# Patient Record
Sex: FEMALE | Race: WHITE | NOT HISPANIC OR LATINO | Employment: FULL TIME | URBAN - NONMETROPOLITAN AREA
[De-identification: names, ages, dates, MRNs, and addresses within clinical notes are randomized per-mention and may not be internally consistent; named-entity substitution may affect disease eponyms.]

---

## 2017-09-27 ENCOUNTER — CONSULT (OUTPATIENT)
Dept: ONCOLOGY | Facility: CLINIC | Age: 47
End: 2017-09-27

## 2017-09-27 ENCOUNTER — APPOINTMENT (OUTPATIENT)
Dept: LAB | Facility: HOSPITAL | Age: 47
End: 2017-09-27

## 2017-09-27 VITALS
SYSTOLIC BLOOD PRESSURE: 128 MMHG | BODY MASS INDEX: 36.53 KG/M2 | WEIGHT: 227.3 LBS | OXYGEN SATURATION: 98 % | TEMPERATURE: 97.9 F | DIASTOLIC BLOOD PRESSURE: 82 MMHG | HEART RATE: 68 BPM | RESPIRATION RATE: 16 BRPM | HEIGHT: 66 IN

## 2017-09-27 DIAGNOSIS — R79.9 ABNORMAL BLOOD CHEMISTRY: Primary | ICD-10-CM

## 2017-09-27 DIAGNOSIS — D69.1 PLATELET DYSFUNCTION (HCC): ICD-10-CM

## 2017-09-27 DIAGNOSIS — D69.1 ABNORMAL PLATELET FUNCTION TEST (HCC): ICD-10-CM

## 2017-09-27 DIAGNOSIS — D64.9 ANEMIA, UNSPECIFIED TYPE: Primary | ICD-10-CM

## 2017-09-27 PROBLEM — R31.9 HEMATURIA: Status: ACTIVE | Noted: 2017-09-27

## 2017-09-27 LAB
ALBUMIN SERPL-MCNC: 4.2 G/DL (ref 3.5–5)
ALBUMIN/GLOB SERPL: 1.3 G/DL (ref 1.1–2.5)
ALP SERPL-CCNC: 82 U/L (ref 24–120)
ALT SERPL W P-5'-P-CCNC: 35 U/L (ref 0–54)
ANION GAP SERPL CALCULATED.3IONS-SCNC: 13 MMOL/L (ref 4–13)
AST SERPL-CCNC: 26 U/L (ref 7–45)
BASOPHILS # BLD AUTO: 0.02 10*3/MM3 (ref 0–0.2)
BASOPHILS NFR BLD AUTO: 0.2 % (ref 0–2)
BILIRUB SERPL-MCNC: 0.3 MG/DL (ref 0.1–1)
BUN BLD-MCNC: 12 MG/DL (ref 5–21)
BUN/CREAT SERPL: 21.8 (ref 7–25)
CALCIUM SPEC-SCNC: 9.1 MG/DL (ref 8.4–10.4)
CHLORIDE SERPL-SCNC: 104 MMOL/L (ref 98–110)
CLOSURE TME COLL+EPINEP BLD: >300 SECONDS (ref 84–176)
CO2 SERPL-SCNC: 27 MMOL/L (ref 24–31)
CREAT BLD-MCNC: 0.55 MG/DL (ref 0.5–1.4)
CYTOLOGIST CVX/VAG CYTO: NORMAL
DEPRECATED RDW RBC AUTO: 43.3 FL (ref 40–54)
EOSINOPHIL # BLD AUTO: 0.09 10*3/MM3 (ref 0–0.7)
EOSINOPHIL NFR BLD AUTO: 1 % (ref 0–4)
ERYTHROCYTE [DISTWIDTH] IN BLOOD BY AUTOMATED COUNT: 14.3 % (ref 12–15)
FERRITIN SERPL-MCNC: 5.98 NG/ML (ref 6.24–137)
FOLATE SERPL-MCNC: 19.3 NG/ML
GFR SERPL CREATININE-BSD FRML MDRD: 119 ML/MIN/1.73
GLOBULIN UR ELPH-MCNC: 3.2 GM/DL
GLUCOSE BLD-MCNC: 100 MG/DL (ref 70–100)
HCT VFR BLD AUTO: 36.8 % (ref 37–47)
HGB BLD-MCNC: 11.9 G/DL (ref 12–16)
IMM GRANULOCYTES # BLD: 0.03 10*3/MM3 (ref 0–0.03)
IMM GRANULOCYTES NFR BLD: 0.3 % (ref 0–5)
IRON 24H UR-MRATE: 42 MCG/DL (ref 42–180)
IRON SATN MFR SERPL: 9 % (ref 20–45)
LDH SERPL-CCNC: 357 U/L (ref 265–665)
LYMPHOCYTES # BLD AUTO: 2.76 10*3/MM3 (ref 0.72–4.86)
LYMPHOCYTES NFR BLD AUTO: 30.9 % (ref 15–45)
Lab: ABNORMAL
MCH RBC QN AUTO: 26.7 PG (ref 28–32)
MCHC RBC AUTO-ENTMCNC: 32.3 G/DL (ref 33–36)
MCV RBC AUTO: 82.7 FL (ref 82–98)
MONOCYTES # BLD AUTO: 0.73 10*3/MM3 (ref 0.19–1.3)
MONOCYTES NFR BLD AUTO: 8.2 % (ref 4–12)
NEUTROPHILS # BLD AUTO: 5.3 10*3/MM3 (ref 1.87–8.4)
NEUTROPHILS NFR BLD AUTO: 59.4 % (ref 39–78)
PATH INTERP BLD-IMP: NORMAL
PLATELET # BLD AUTO: 286 10*3/MM3 (ref 130–400)
PLT THERAPY DRUG: ABNORMAL
PMV BLD AUTO: 10.5 FL (ref 6–12)
POTASSIUM BLD-SCNC: 3.9 MMOL/L (ref 3.5–5.3)
PROT SERPL-MCNC: 7.4 G/DL (ref 6.3–8.7)
RBC # BLD AUTO: 4.45 10*6/MM3 (ref 4.2–5.4)
SODIUM BLD-SCNC: 144 MMOL/L (ref 135–145)
TIBC SERPL-MCNC: 471 MCG/DL (ref 225–420)
VIT B12 BLD-MCNC: 648 PG/ML (ref 239–931)
WBC NRBC COR # BLD: 8.93 10*3/MM3 (ref 4.8–10.8)

## 2017-09-27 PROCEDURE — 85730 THROMBOPLASTIN TIME PARTIAL: CPT

## 2017-09-27 PROCEDURE — 85576 BLOOD PLATELET AGGREGATION: CPT | Performed by: INTERNAL MEDICINE

## 2017-09-27 PROCEDURE — 85245 CLOT FACTOR VIII VW RISTOCTN: CPT | Performed by: INTERNAL MEDICINE

## 2017-09-27 PROCEDURE — 83550 IRON BINDING TEST: CPT | Performed by: INTERNAL MEDICINE

## 2017-09-27 PROCEDURE — 83540 ASSAY OF IRON: CPT | Performed by: INTERNAL MEDICINE

## 2017-09-27 PROCEDURE — 84165 PROTEIN E-PHORESIS SERUM: CPT | Performed by: INTERNAL MEDICINE

## 2017-09-27 PROCEDURE — 82746 ASSAY OF FOLIC ACID SERUM: CPT | Performed by: INTERNAL MEDICINE

## 2017-09-27 PROCEDURE — 84155 ASSAY OF PROTEIN SERUM: CPT | Performed by: INTERNAL MEDICINE

## 2017-09-27 PROCEDURE — 36415 COLL VENOUS BLD VENIPUNCTURE: CPT

## 2017-09-27 PROCEDURE — 85240 CLOT FACTOR VIII AHG 1 STAGE: CPT | Performed by: INTERNAL MEDICINE

## 2017-09-27 PROCEDURE — 85246 CLOT FACTOR VIII VW ANTIGEN: CPT | Performed by: INTERNAL MEDICINE

## 2017-09-27 PROCEDURE — 83883 ASSAY NEPHELOMETRY NOT SPEC: CPT | Performed by: INTERNAL MEDICINE

## 2017-09-27 PROCEDURE — 82728 ASSAY OF FERRITIN: CPT | Performed by: INTERNAL MEDICINE

## 2017-09-27 PROCEDURE — 83615 LACTATE (LD) (LDH) ENZYME: CPT | Performed by: INTERNAL MEDICINE

## 2017-09-27 PROCEDURE — 99205 OFFICE O/P NEW HI 60 MIN: CPT | Performed by: INTERNAL MEDICINE

## 2017-09-27 PROCEDURE — 85060 BLOOD SMEAR INTERPRETATION: CPT | Performed by: INTERNAL MEDICINE

## 2017-09-27 PROCEDURE — 86334 IMMUNOFIX E-PHORESIS SERUM: CPT | Performed by: INTERNAL MEDICINE

## 2017-09-27 PROCEDURE — 80053 COMPREHEN METABOLIC PANEL: CPT | Performed by: INTERNAL MEDICINE

## 2017-09-27 PROCEDURE — 82607 VITAMIN B-12: CPT | Performed by: INTERNAL MEDICINE

## 2017-09-27 PROCEDURE — 82232 ASSAY OF BETA-2 PROTEIN: CPT | Performed by: INTERNAL MEDICINE

## 2017-09-27 PROCEDURE — 85025 COMPLETE CBC W/AUTO DIFF WBC: CPT | Performed by: INTERNAL MEDICINE

## 2017-09-27 RX ORDER — TRAZODONE HYDROCHLORIDE 100 MG/1
TABLET ORAL
Refills: 2 | COMMUNITY
Start: 2017-09-13

## 2017-09-27 RX ORDER — OMEPRAZOLE 40 MG/1
CAPSULE, DELAYED RELEASE ORAL
Refills: 2 | COMMUNITY
Start: 2017-07-27 | End: 2021-12-08 | Stop reason: ALTCHOICE

## 2017-09-27 RX ORDER — FAMOTIDINE 40 MG/1
TABLET, FILM COATED ORAL
Refills: 2 | COMMUNITY
Start: 2017-07-27 | End: 2020-05-19

## 2017-09-27 RX ORDER — METOPROLOL SUCCINATE 25 MG/1
TABLET, EXTENDED RELEASE ORAL
Refills: 2 | COMMUNITY
Start: 2017-09-13 | End: 2021-12-08 | Stop reason: ALTCHOICE

## 2017-09-27 RX ORDER — ERGOCALCIFEROL 1.25 MG/1
CAPSULE ORAL
Refills: 2 | COMMUNITY
Start: 2017-09-13 | End: 2021-04-19

## 2017-09-27 NOTE — PROGRESS NOTES
[facility]  HEMATOLOGY & ONCOLOGY        Subjective     VISIT DIAGNOSIS:   Encounter Diagnoses   Name Primary?   • Abnormal platelet function test Yes   • Platelet dysfunction    • Anemia, unspecified type        REASON FOR VISIT:     Chief Complaint   Patient presents with   • Abnormal platelet function     Consult        HEMATOLOGY / ONCOLOGY HISTORY:   Oncology/Hematology History    46-year-old patient history of hematuria, she was referred to urology for workup for hematuria.  Last showed PFA abnormal with collagen epinephrine binding greater than 300.  Late left 272, ProTime 12.5 normal INR of 0.9..  She denies nosebleeds, or hemoptysis.  She denies family history of bleeding.  She had a hysterectomy due to bleeding fibroids.  She denies  easy bruising.  No lymphadenopathy.       [No treatment plan]  Cancer Staging Information:  No matching staging information was found for the patient.      INTERVAL HISTORY  Patient ID: Kathy Meadows is a 46 y.o. year old female whom am seeing in consultation for abnormal platelet function tests concerning for von Willebrand disease.        Review of Systems   Constitutional: Negative.    HENT: Negative.    Eyes: Negative.    Respiratory: Negative.    Gastrointestinal: Negative.    Endocrine: Negative.    Genitourinary: Positive for hematuria.   Musculoskeletal: Negative.    Skin: Negative.    Allergic/Immunologic: Negative.    Neurological: Negative.    Hematological: Negative.    Psychiatric/Behavioral: Negative.             Medications:    Current Outpatient Prescriptions   Medication Sig Dispense Refill   • famotidine (PEPCID) 40 MG tablet TK 1 T PO D HS  2   • metoprolol succinate XL (TOPROL-XL) 25 MG 24 hr tablet TK 1 T PO ONCE A DAY. FOR HIGH BP  2   • omeprazole (priLOSEC) 40 MG capsule TK ONE C PO BID  2   • traZODone (DESYREL) 100 MG tablet TK 1 T PO HS. TO IMPROVE QUALITY OF SLEEP  2   • vitamin D (ERGOCALCIFEROL) 00020 units capsule capsule TK  ONE C PO ONCE A WEEK  2     No current facility-administered medications for this visit.        ALLERGIES:  No Known Allergies    Objective      Vitals:    09/27/17 1505   BP: 128/82   Pulse: 68   Resp: 16   Temp: 97.9 °F (36.6 °C)   SpO2: 98%       No flowsheet data found.    General Appearance: Patient is awake, alert, oriented and in no acute distress. Patient is welldeveloped, wellnourished, and appears stated age.  HEENT: Normocephalic. Sclerae clear, conjunctiva pink, extraocular movements intact, pupils, round, reactive to light and  accommodation. Mouth and throat are clear with moist oral mucosa.  NECK: Supple, no jugular venous distention, thyroid not enlarged.  LYMPH: No cervical, supraclavicular, axillary, or inguinal lymphadenopathy.  CHEST: Equal bilateral expansion, AP  diameter normal, resonant percussion note  LUNGS: Good air movement, no rales, rhonchi, rubs or wheezes with auscultation  CARDIO: Regular sinus rhythm, no murmurs, gallops or rubs.  ABDOMEN: Nondistended, soft, No tenderness, no guarding, no rebound, No hepatosplenomegaly. No abdominal masses. Bowel sounds positive. No hernia  GENITALIA: Not examined.  BREASTS: Not examined.  MUSKEL: No joint swelling, decreased motion, or inflammation  EXTREMS: No edema, clubbing, cyanosis, No varicose veins.  NEURO: Grossly nonfocal, Gait is coordinated and smooth, Cognition is preserved.  SKIN: No rashes, no ecchymoses, no petechia.  PSYCH: Oriented to time, place and person. Memory is preserved. Mood and affect appear normal      RECENT LABS:  Orders Only on 09/27/2017   Component Date Value Ref Range Status   • Glucose 09/27/2017 100  70 - 100 mg/dL Final   • BUN 09/27/2017 12  5 - 21 mg/dL Final   • Creatinine 09/27/2017 0.55  0.50 - 1.40 mg/dL Final   • Sodium 09/27/2017 144  135 - 145 mmol/L Final   • Potassium 09/27/2017 3.9  3.5 - 5.3 mmol/L Final   • Chloride 09/27/2017 104  98 - 110 mmol/L Final   • CO2 09/27/2017 27.0  24.0 - 31.0 mmol/L  Final   • Calcium 09/27/2017 9.1  8.4 - 10.4 mg/dL Final   • Total Protein 09/27/2017 7.4  6.3 - 8.7 g/dL Final   • Albumin 09/27/2017 4.20  3.50 - 5.00 g/dL Final   • ALT (SGPT) 09/27/2017 35  0 - 54 U/L Final   • AST (SGOT) 09/27/2017 26  7 - 45 U/L Final   • Alkaline Phosphatase 09/27/2017 82  24 - 120 U/L Final   • Total Bilirubin 09/27/2017 0.3  0.1 - 1.0 mg/dL Final   • eGFR Non African Amer 09/27/2017 119  >60 mL/min/1.73 Final   • Globulin 09/27/2017 3.2  gm/dL Final   • A/G Ratio 09/27/2017 1.3  1.1 - 2.5 g/dL Final   • BUN/Creatinine Ratio 09/27/2017 21.8  7.0 - 25.0 Final   • Anion Gap 09/27/2017 13.0  4.0 - 13.0 mmol/L Final   • WBC 09/27/2017 8.93  4.80 - 10.80 10*3/mm3 Final   • RBC 09/27/2017 4.45  4.20 - 5.40 10*6/mm3 Final   • Hemoglobin 09/27/2017 11.9* 12.0 - 16.0 g/dL Final   • Hematocrit 09/27/2017 36.8* 37.0 - 47.0 % Final   • MCV 09/27/2017 82.7  82.0 - 98.0 fL Final   • MCH 09/27/2017 26.7* 28.0 - 32.0 pg Final   • MCHC 09/27/2017 32.3* 33.0 - 36.0 g/dL Final   • RDW 09/27/2017 14.3  12.0 - 15.0 % Final   • RDW-SD 09/27/2017 43.3  40.0 - 54.0 fl Final   • MPV 09/27/2017 10.5  6.0 - 12.0 fL Final   • Platelets 09/27/2017 286  130 - 400 10*3/mm3 Final   • Neutrophil % 09/27/2017 59.4  39.0 - 78.0 % Final   • Lymphocyte % 09/27/2017 30.9  15.0 - 45.0 % Final   • Monocyte % 09/27/2017 8.2  4.0 - 12.0 % Final   • Eosinophil % 09/27/2017 1.0  0.0 - 4.0 % Final   • Basophil % 09/27/2017 0.2  0.0 - 2.0 % Final   • Immature Grans % 09/27/2017 0.3  0.0 - 5.0 % Final   • Neutrophils, Absolute 09/27/2017 5.30  1.87 - 8.40 10*3/mm3 Final   • Lymphocytes, Absolute 09/27/2017 2.76  0.72 - 4.86 10*3/mm3 Final   • Monocytes, Absolute 09/27/2017 0.73  0.19 - 1.30 10*3/mm3 Final   • Eosinophils, Absolute 09/27/2017 0.09  0.00 - 0.70 10*3/mm3 Final   • Basophils, Absolute 09/27/2017 0.02  0.00 - 0.20 10*3/mm3 Final   • Immature Grans, Absolute 09/27/2017 0.03  0.00 - 0.03 10*3/mm3 Final       RADIOLOGY:  No  results found.         Assessment/Plan 46-year-old female with medical history significant for hematuria, hypertension, hypercalcemia, GERD who I am seen due to abnormal  Platelets function suggestive of von Willebrand disease.     1.  Mucocutaneous bleed: Preliminary workup suspicious for von Willebrand disease.    2.  Anemia: Suspect from loss via the .  We will check iron study, also we need to rule out concomitant GI blood loss.  Also rule out myeloma.        Time Spent: 60 minutes; greater than  50% of time was spent in patient counseling and care coordination.     Milvia Bhatti MD    9/27/2017    3:32 PM

## 2017-09-28 LAB
ALBUMIN SERPL-MCNC: 3.7 G/DL (ref 2.9–4.4)
ALBUMIN/GLOB SERPL: 1.2 {RATIO} (ref 0.7–1.7)
ALPHA1 GLOB FLD ELPH-MCNC: 0.3 G/DL (ref 0–0.4)
ALPHA2 GLOB SERPL ELPH-MCNC: 0.7 G/DL (ref 0.4–1)
B-GLOBULIN SERPL ELPH-MCNC: 1 G/DL (ref 0.7–1.3)
B2 MICROGLOB SERPL-MCNC: 1.2 MG/L (ref 0.6–2.4)
GAMMA GLOB SERPL ELPH-MCNC: 1.2 G/DL (ref 0.4–1.8)
GLOBULIN SER CALC-MCNC: 3.1 G/DL (ref 2.2–3.9)
IGA SERPL-MCNC: 200 MG/DL (ref 87–352)
IGG SERPL-MCNC: 951 MG/DL (ref 700–1600)
IGM SERPL-MCNC: 197 MG/DL (ref 26–217)
KAPPA LC SERPL-MCNC: 20.6 MG/L (ref 3.3–19.4)
KAPPA LC/LAMBDA SER: 1.27 {RATIO} (ref 0.26–1.65)
LAMBDA LC FREE SERPL-MCNC: 16.2 MG/L (ref 5.7–26.3)
Lab: NORMAL
M-SPIKE: NORMAL G/DL
PROT PATTERN SERPL ELPH-IMP: NORMAL
PROT PATTERN SERPL IFE-IMP: NORMAL
PROT SERPL-MCNC: 6.8 G/DL (ref 6–8.5)

## 2017-09-29 LAB
APTT PPP: 28.1 SEC
FACT VIII ACT/NOR PPP: 71 %
VWF AG ACT/NOR PPP IA: 66 %
VWF CP PPP QL: 58 %

## 2017-10-03 ENCOUNTER — TELEPHONE (OUTPATIENT)
Dept: ONCOLOGY | Facility: CLINIC | Age: 47
End: 2017-10-03

## 2017-10-03 NOTE — TELEPHONE ENCOUNTER
Called Kathy about lab results and that she needs iron infusions.  Stated that she is just starting a new job and if possible to call her back the end of next week so that she can see what her schedule is going to be.

## 2017-10-03 NOTE — TELEPHONE ENCOUNTER
----- Message from Milvia Bhatti MD sent at 9/29/2017 12:38 PM CDT -----  Is she scheduled for iv iron?? Please do so

## 2017-10-06 DIAGNOSIS — D69.1 QUALITATIVE PLATELET DEFECTS (HCC): Primary | ICD-10-CM

## 2017-10-11 ENCOUNTER — OFFICE VISIT (OUTPATIENT)
Dept: ONCOLOGY | Facility: CLINIC | Age: 47
End: 2017-10-11

## 2017-10-11 ENCOUNTER — LAB (OUTPATIENT)
Dept: LAB | Facility: HOSPITAL | Age: 47
End: 2017-10-11

## 2017-10-11 VITALS
BODY MASS INDEX: 36.95 KG/M2 | OXYGEN SATURATION: 98 % | DIASTOLIC BLOOD PRESSURE: 80 MMHG | SYSTOLIC BLOOD PRESSURE: 130 MMHG | TEMPERATURE: 97.8 F | WEIGHT: 229.9 LBS | HEIGHT: 66 IN | HEART RATE: 70 BPM | RESPIRATION RATE: 18 BRPM

## 2017-10-11 DIAGNOSIS — D50.0 IRON DEFICIENCY ANEMIA DUE TO CHRONIC BLOOD LOSS: ICD-10-CM

## 2017-10-11 DIAGNOSIS — R31.9 HEMATURIA, UNSPECIFIED TYPE: Primary | ICD-10-CM

## 2017-10-11 DIAGNOSIS — D69.1 PLATELET DYSFUNCTION (HCC): ICD-10-CM

## 2017-10-11 DIAGNOSIS — D50.9 IRON DEFICIENCY ANEMIA, UNSPECIFIED IRON DEFICIENCY ANEMIA TYPE: Primary | ICD-10-CM

## 2017-10-11 PROBLEM — K90.9 INTESTINAL MALABSORPTION: Status: ACTIVE | Noted: 2017-10-11

## 2017-10-11 LAB
ALBUMIN SERPL-MCNC: 3.8 G/DL (ref 3.5–5)
ALBUMIN/GLOB SERPL: 1.1 G/DL (ref 1.1–2.5)
ALP SERPL-CCNC: 74 U/L (ref 24–120)
ALT SERPL W P-5'-P-CCNC: 35 U/L (ref 0–54)
ANION GAP SERPL CALCULATED.3IONS-SCNC: 9 MMOL/L (ref 4–13)
AST SERPL-CCNC: 27 U/L (ref 7–45)
AUTO MIXED CELLS #: 0.4 10*3/MM3 (ref 0.1–2.6)
AUTO MIXED CELLS %: 8.5 % (ref 0.1–24)
BILIRUB SERPL-MCNC: 0.4 MG/DL (ref 0.1–1)
BUN BLD-MCNC: 11 MG/DL (ref 5–21)
BUN/CREAT SERPL: 17.7
CALCIUM SPEC-SCNC: 8.7 MG/DL (ref 8.4–10.4)
CHLORIDE SERPL-SCNC: 104 MMOL/L (ref 98–110)
CLOSURE TME COLL+ADP BLD: >300 SECONDS (ref 51–124)
CLOSURE TME COLL+EPINEP BLD: >300 SECONDS (ref 84–176)
CO2 SERPL-SCNC: 28 MMOL/L (ref 24–31)
CREAT BLD-MCNC: 0.62 MG/DL (ref 0.5–1.4)
ERYTHROCYTE [DISTWIDTH] IN BLOOD BY AUTOMATED COUNT: 13.7 % (ref 12–15)
GFR SERPL CREATININE-BSD FRML MDRD: 103 ML/MIN/1.73
GLOBULIN UR ELPH-MCNC: 3.4 GM/DL
GLUCOSE BLD-MCNC: 104 MG/DL (ref 70–100)
HCT VFR BLD AUTO: 35.4 % (ref 37–47)
HGB BLD-MCNC: 11.8 G/DL (ref 12–16)
HOLD SPECIMEN: NORMAL
LYMPHOCYTES # BLD AUTO: 2.4 10*3/MM3 (ref 0.8–7)
LYMPHOCYTES NFR BLD AUTO: 45.5 % (ref 15–45)
Lab: ABNORMAL
MCH RBC QN AUTO: 26.8 PG (ref 28–32)
MCHC RBC AUTO-ENTMCNC: 33.3 G/DL (ref 33–36)
MCV RBC AUTO: 80.3 FL (ref 82–98)
NEUTROPHILS # BLD AUTO: 2.4 10*3/MM3 (ref 1.5–8.3)
NEUTROPHILS NFR BLD AUTO: 46 % (ref 39–78)
PLATELET # BLD AUTO: 262 10*3/MM3 (ref 130–400)
PLT THERAPY DRUG: ABNORMAL
PMV BLD AUTO: 9 FL (ref 6–12)
POTASSIUM BLD-SCNC: 3.4 MMOL/L (ref 3.5–5.3)
PROT SERPL-MCNC: 7.2 G/DL (ref 6.3–8.7)
RBC # BLD AUTO: 4.41 10*6/MM3 (ref 4.2–5.4)
SODIUM BLD-SCNC: 141 MMOL/L (ref 135–145)
WBC NRBC COR # BLD: 5.2 10*3/MM3 (ref 4.8–10.8)

## 2017-10-11 PROCEDURE — 85576 BLOOD PLATELET AGGREGATION: CPT | Performed by: INTERNAL MEDICINE

## 2017-10-11 PROCEDURE — 36415 COLL VENOUS BLD VENIPUNCTURE: CPT | Performed by: INTERNAL MEDICINE

## 2017-10-11 PROCEDURE — 85025 COMPLETE CBC W/AUTO DIFF WBC: CPT | Performed by: INTERNAL MEDICINE

## 2017-10-11 PROCEDURE — 99214 OFFICE O/P EST MOD 30 MIN: CPT | Performed by: INTERNAL MEDICINE

## 2017-10-11 PROCEDURE — 80053 COMPREHEN METABOLIC PANEL: CPT | Performed by: INTERNAL MEDICINE

## 2017-10-11 RX ORDER — BIOTIN 1 MG
1000 TABLET ORAL 3 TIMES DAILY
COMMUNITY
End: 2020-05-19

## 2017-10-11 NOTE — PROGRESS NOTES
[facility]  HEMATOLOGY & ONCOLOGY    Cancer Staging Information:  No matching staging information was found for the patient.      Subjective     VISIT DIAGNOSIS:   Encounter Diagnoses   Name Primary?   • Hematuria, unspecified type Yes   • Platelet dysfunction    • Iron deficiency anemia due to chronic blood loss        REASON FOR VISIT:     Chief Complaint   Patient presents with   • Anemia     f/u   • abnormal platelet function     f/u        HEMATOLOGY / ONCOLOGY HISTORY:   Oncology/Hematology History    46-year-old patient history of hematuria, she was referred to urology for workup for hematuria.  Last showed PFA abnormal with collagen epinephrine binding greater than 300.  Late left 272, ProTime 12.5 normal INR of 0.9..  She denies nosebleeds, or hemoptysis.  She denies family history of bleeding.  She had a hysterectomy due to bleeding fibroids.  She denies  easy bruising.  No lymphadenopathy.             INTERVAL HISTORY  Patient ID: Kathy Meadows is a 47 y.o. year old female nephrectomy due to abnormal platelet function tests.  Workup showed so far shows normal von Willebrand screening, iron deficiency anemia, negative for multiple, myeloma, normal folate and B12 .  She has normal bilirubin so this rules out hemolysis.    Past Medical History:   Past Medical History:   Diagnosis Date   • Abnormal platelet function test 9/27/2017   • Hematuria 9/27/2017   • Iron deficiency anemia 10/11/2017     Past Surgical History: No past surgical history on file.  Social History:   Social History     Social History   • Marital status:      Spouse name: N/A   • Number of children: N/A   • Years of education: N/A     Occupational History   • Not on file.     Social History Main Topics   • Smoking status: Not on file   • Smokeless tobacco: Not on file   • Alcohol use Not on file   • Drug use: Not on file   • Sexual activity: Not on file     Other Topics Concern   • Not on file     Social History  "Narrative     Family History: History reviewed. No pertinent family history.    Review of Systems   Constitutional:        Chronic low fatigue.   HENT: Negative.    Eyes: Negative.    Respiratory: Negative.    Gastrointestinal: Negative.    Endocrine: Negative.    Genitourinary: Positive for hematuria.   Musculoskeletal: Negative.    Skin: Negative.    Allergic/Immunologic: Negative.    Neurological: Negative.    Hematological: Negative.    Psychiatric/Behavioral: Negative.         Performance Status:  Asymptomatic    Medications:    Current Outpatient Prescriptions   Medication Sig Dispense Refill   • B Complex-C-E-Zn (B COMPLEX-C-E-ZINC) tablet Take 1 tablet by mouth Daily.     • Biotin 1000 MCG tablet Take 1,000 mcg by mouth 3 (Three) Times a Day.     • magnesium oxide (MAGOX) 400 (241.3 Mg) MG tablet tablet Take 400 mg by mouth Daily.     • famotidine (PEPCID) 40 MG tablet TK 1 T PO D HS  2   • metoprolol succinate XL (TOPROL-XL) 25 MG 24 hr tablet TK 1 T PO ONCE A DAY. FOR HIGH BP  2   • omeprazole (priLOSEC) 40 MG capsule TK ONE C PO BID  2   • traZODone (DESYREL) 100 MG tablet TK 1 T PO HS. TO IMPROVE QUALITY OF SLEEP  2   • vitamin D (ERGOCALCIFEROL) 00111 units capsule capsule TK ONE C PO ONCE A WEEK  2     No current facility-administered medications for this visit.        ALLERGIES:  No Known Allergies    Objective      Vitals:    10/11/17 1416   BP: 130/80   Pulse: 70   Resp: 18   Temp: 97.8 °F (36.6 °C)   TempSrc: Tympanic   SpO2: 98%   Weight: 229 lb 14.4 oz (104 kg)   Height: 66\" (167.6 cm)         Current Status 10/11/2017   ECOG score 0         Physical Exam  General Appearance: Patient is awake, alert, oriented and in no acute distress. Patient is welldeveloped, wellnourished, and appears stated age.  HEENT: Normocephalic. Sclerae clear, conjunctiva pink, extraocular movements intact, pupils, round, reactive to light and  accommodation. Telangiectasia of the upper lip. Mouth and throat are clear " with moist oral mucosa.  NECK: Supple, no jugular venous distention, thyroid not enlarged.  LYMPH: No cervical, supraclavicular, axillary, or inguinal lymphadenopathy.  CHEST: Equal bilateral expansion, AP  diameter normal, resonant percussion note  LUNGS: Good air movement, no rales, rhonchi, rubs or wheezes with auscultation  CARDIO: Regular sinus rhythm, no murmurs, gallops or rubs.  ABDOMEN: Nondistended, soft, No tenderness, no guarding, no rebound, No hepatosplenomegaly. No abdominal masses. Bowel sounds positive. No hernia  GENITALIA: Not examined.  BREASTS: Not examined.  MUSKEL: No joint swelling, decreased motion, or inflammation  EXTREMS: No edema, clubbing, cyanosis, No varicose veins.  NEURO: Grossly nonfocal, Gait is coordinated and smooth, Cognition is preserved.  SKIN: No rashes, no ecchymoses, no petechia.  PSYCH: Oriented to time, place and person. Memory is preserved. Mood and affect appear normal  RECENT LABS:  Orders Only on 10/06/2017   Component Date Value Ref Range Status   • Glucose 10/11/2017 104* 70 - 100 mg/dL Final   • BUN 10/11/2017 11  5 - 21 mg/dL Final   • Creatinine 10/11/2017 0.62  0.50 - 1.40 mg/dL Final   • Sodium 10/11/2017 141  135 - 145 mmol/L Final   • Potassium 10/11/2017 3.4* 3.5 - 5.3 mmol/L Final   • Chloride 10/11/2017 104  98 - 110 mmol/L Final   • CO2 10/11/2017 28.0  24.0 - 31.0 mmol/L Final   • Calcium 10/11/2017 8.7  8.4 - 10.4 mg/dL Final   • Total Protein 10/11/2017 7.2  6.3 - 8.7 g/dL Final   • Albumin 10/11/2017 3.80  3.50 - 5.00 g/dL Final   • ALT (SGPT) 10/11/2017 35  0 - 54 U/L Final   • AST (SGOT) 10/11/2017 27  7 - 45 U/L Final   • Alkaline Phosphatase 10/11/2017 74  24 - 120 U/L Final   • Total Bilirubin 10/11/2017 0.4  0.1 - 1.0 mg/dL Final   • eGFR Non African Amer 10/11/2017 103  >60 mL/min/1.73 Final   • Globulin 10/11/2017 3.4  gm/dL Final   • A/G Ratio 10/11/2017 1.1  1.1 - 2.5 g/dL Final   • BUN/Creatinine Ratio 10/11/2017 17.7    Final   • Anion  Gap 10/11/2017 9.0  4.0 - 13.0 mmol/L Final   • WBC 10/11/2017 5.20  4.80 - 10.80 10*3/mm3 Final   • RBC 10/11/2017 4.41  4.20 - 5.40 10*6/mm3 Final   • Hemoglobin 10/11/2017 11.8* 12.0 - 16.0 g/dL Final   • Hematocrit 10/11/2017 35.4* 37.0 - 47.0 % Final   • MCV 10/11/2017 80.3* 82.0 - 98.0 fL Final   • MCH 10/11/2017 26.8* 28.0 - 32.0 pg Final   • MCHC 10/11/2017 33.3  33.0 - 36.0 g/dL Final   • RDW 10/11/2017 13.7  12.0 - 15.0 % Final   • MPV 10/11/2017 9.0  6.0 - 12.0 fL Final   • Platelets 10/11/2017 262  130 - 400 10*3/mm3 Final   • Neutrophil % 10/11/2017 46.0  39.0 - 78.0 % Final   • Lymphocyte % 10/11/2017 45.5* 15.0 - 45.0 % Final   • Auto Mixed Cells % 10/11/2017 8.5  0.1 - 24.0 % Final   • Neutrophils, Absolute 10/11/2017 2.40  1.50 - 8.30 10*3/mm3 Final   • Lymphocytes, Absolute 10/11/2017 2.40  0.80 - 7.00 10*3/mm3 Final   • Auto Mixed Cells # 10/11/2017 0.40  0.10 - 2.60 10*3/mm3 Final       RADIOLOGY:  No results found.         Assessment/Plan  Kathy Meadows is a 47 y.o. year old female     Patient Active Problem List   Diagnosis   • Abnormal platelet function test   • Hematuria   • Iron deficiency anemia          1.Hematuria: Platelet function test shows prolonged collagen epinephrine binding.  However Collagen ADP binding was not done due to insufficient specimen.  Thus far von Willebrand screening is negative.  In addition she is anemic.  Causes of abnormal platelet binding are anemia versus drug-induced platelet dysfunction versus von Willebrand disease.  We will look to correct her anemia, also check Collagen ADP binding. If normal then her platelet dysfunction is most likely related to NSAIDs. Otherwise anemia or significant platelet function defect other than aspirin.     2.  Anemia: Iron deficiency anemia due to hematuria versus GI blood loss versus lack of absorption.  We will replace by IV. Also she does have visible telangiectasia on her upper lips, am wondering if she has hereditary  hemorrhagic telangiectasias (HHT). Patient will be evaluated by GI for EGD and colonoscopy to rule out GI blood loss.          Milvia Bhatti MD    10/11/2017    2:46 PM

## 2017-10-13 ENCOUNTER — INFUSION (OUTPATIENT)
Dept: ONCOLOGY | Facility: HOSPITAL | Age: 47
End: 2017-10-13

## 2017-10-13 VITALS
HEART RATE: 64 BPM | BODY MASS INDEX: 36.48 KG/M2 | DIASTOLIC BLOOD PRESSURE: 72 MMHG | WEIGHT: 227 LBS | SYSTOLIC BLOOD PRESSURE: 124 MMHG | HEIGHT: 66 IN | RESPIRATION RATE: 18 BRPM | TEMPERATURE: 97.6 F | OXYGEN SATURATION: 100 %

## 2017-10-13 DIAGNOSIS — D50.9 IRON DEFICIENCY ANEMIA, UNSPECIFIED IRON DEFICIENCY ANEMIA TYPE: ICD-10-CM

## 2017-10-13 DIAGNOSIS — K90.9 INTESTINAL MALABSORPTION, UNSPECIFIED TYPE: ICD-10-CM

## 2017-10-13 DIAGNOSIS — D50.9 IRON DEFICIENCY ANEMIA, UNSPECIFIED IRON DEFICIENCY ANEMIA TYPE: Primary | ICD-10-CM

## 2017-10-13 PROCEDURE — 96365 THER/PROPH/DIAG IV INF INIT: CPT

## 2017-10-13 PROCEDURE — 25010000002 IRON SUCROSE PER 1 MG: Performed by: INTERNAL MEDICINE

## 2017-10-13 RX ORDER — FAMOTIDINE 10 MG/ML
20 INJECTION, SOLUTION INTRAVENOUS AS NEEDED
Status: CANCELLED | OUTPATIENT
Start: 2017-10-25

## 2017-10-13 RX ORDER — SODIUM CHLORIDE 9 MG/ML
250 INJECTION, SOLUTION INTRAVENOUS ONCE
Status: CANCELLED | OUTPATIENT
Start: 2017-10-31

## 2017-10-13 RX ORDER — DIPHENHYDRAMINE HYDROCHLORIDE 50 MG/ML
50 INJECTION INTRAMUSCULAR; INTRAVENOUS AS NEEDED
Status: CANCELLED | OUTPATIENT
Start: 2017-11-01

## 2017-10-13 RX ORDER — DIPHENHYDRAMINE HYDROCHLORIDE 50 MG/ML
50 INJECTION INTRAMUSCULAR; INTRAVENOUS AS NEEDED
Status: CANCELLED | OUTPATIENT
Start: 2017-10-26

## 2017-10-13 RX ORDER — FAMOTIDINE 10 MG/ML
20 INJECTION, SOLUTION INTRAVENOUS AS NEEDED
Status: CANCELLED | OUTPATIENT
Start: 2017-10-18

## 2017-10-13 RX ORDER — SODIUM CHLORIDE 9 MG/ML
250 INJECTION, SOLUTION INTRAVENOUS ONCE
Status: CANCELLED | OUTPATIENT
Start: 2017-11-01

## 2017-10-13 RX ORDER — SODIUM CHLORIDE 9 MG/ML
250 INJECTION, SOLUTION INTRAVENOUS ONCE
Status: CANCELLED | OUTPATIENT
Start: 2017-10-24

## 2017-10-13 RX ORDER — DIPHENHYDRAMINE HYDROCHLORIDE 50 MG/ML
50 INJECTION INTRAMUSCULAR; INTRAVENOUS AS NEEDED
Status: CANCELLED | OUTPATIENT
Start: 2017-10-18

## 2017-10-13 RX ORDER — DIPHENHYDRAMINE HYDROCHLORIDE 50 MG/ML
50 INJECTION INTRAMUSCULAR; INTRAVENOUS AS NEEDED
Status: CANCELLED | OUTPATIENT
Start: 2017-10-24

## 2017-10-13 RX ORDER — SODIUM CHLORIDE 9 MG/ML
250 INJECTION, SOLUTION INTRAVENOUS ONCE
Status: CANCELLED | OUTPATIENT
Start: 2017-10-16

## 2017-10-13 RX ORDER — SODIUM CHLORIDE 9 MG/ML
250 INJECTION, SOLUTION INTRAVENOUS ONCE
Status: CANCELLED | OUTPATIENT
Start: 2017-10-26

## 2017-10-13 RX ORDER — DIPHENHYDRAMINE HYDROCHLORIDE 50 MG/ML
50 INJECTION INTRAMUSCULAR; INTRAVENOUS AS NEEDED
Status: CANCELLED | OUTPATIENT
Start: 2017-10-16

## 2017-10-13 RX ORDER — FAMOTIDINE 10 MG/ML
20 INJECTION, SOLUTION INTRAVENOUS AS NEEDED
Status: CANCELLED | OUTPATIENT
Start: 2017-11-01

## 2017-10-13 RX ORDER — FAMOTIDINE 10 MG/ML
20 INJECTION, SOLUTION INTRAVENOUS AS NEEDED
Status: CANCELLED | OUTPATIENT
Start: 2017-10-16

## 2017-10-13 RX ORDER — SODIUM CHLORIDE 9 MG/ML
250 INJECTION, SOLUTION INTRAVENOUS ONCE
Status: CANCELLED | OUTPATIENT
Start: 2017-10-13

## 2017-10-13 RX ORDER — SODIUM CHLORIDE 9 MG/ML
250 INJECTION, SOLUTION INTRAVENOUS ONCE
Status: CANCELLED | OUTPATIENT
Start: 2017-10-25

## 2017-10-13 RX ORDER — FAMOTIDINE 10 MG/ML
20 INJECTION, SOLUTION INTRAVENOUS AS NEEDED
Status: CANCELLED | OUTPATIENT
Start: 2017-10-13

## 2017-10-13 RX ORDER — DIPHENHYDRAMINE HYDROCHLORIDE 50 MG/ML
50 INJECTION INTRAMUSCULAR; INTRAVENOUS AS NEEDED
Status: CANCELLED | OUTPATIENT
Start: 2017-10-31

## 2017-10-13 RX ORDER — SODIUM CHLORIDE 9 MG/ML
250 INJECTION, SOLUTION INTRAVENOUS ONCE
Status: CANCELLED | OUTPATIENT
Start: 2017-10-18

## 2017-10-13 RX ORDER — FAMOTIDINE 10 MG/ML
20 INJECTION, SOLUTION INTRAVENOUS AS NEEDED
Status: DISCONTINUED | OUTPATIENT
Start: 2017-10-13 | End: 2017-10-13 | Stop reason: HOSPADM

## 2017-10-13 RX ORDER — DIPHENHYDRAMINE HYDROCHLORIDE 50 MG/ML
50 INJECTION INTRAMUSCULAR; INTRAVENOUS AS NEEDED
Status: CANCELLED | OUTPATIENT
Start: 2017-10-25

## 2017-10-13 RX ORDER — FAMOTIDINE 10 MG/ML
20 INJECTION, SOLUTION INTRAVENOUS AS NEEDED
Status: CANCELLED | OUTPATIENT
Start: 2017-10-31

## 2017-10-13 RX ORDER — SODIUM CHLORIDE 9 MG/ML
250 INJECTION, SOLUTION INTRAVENOUS ONCE
Status: COMPLETED | OUTPATIENT
Start: 2017-10-13 | End: 2017-10-13

## 2017-10-13 RX ORDER — DIPHENHYDRAMINE HYDROCHLORIDE 50 MG/ML
50 INJECTION INTRAMUSCULAR; INTRAVENOUS AS NEEDED
Status: CANCELLED | OUTPATIENT
Start: 2017-10-13

## 2017-10-13 RX ORDER — FAMOTIDINE 10 MG/ML
20 INJECTION, SOLUTION INTRAVENOUS AS NEEDED
Status: CANCELLED | OUTPATIENT
Start: 2017-10-24

## 2017-10-13 RX ORDER — FAMOTIDINE 10 MG/ML
20 INJECTION, SOLUTION INTRAVENOUS AS NEEDED
Status: CANCELLED | OUTPATIENT
Start: 2017-10-26

## 2017-10-13 RX ORDER — DIPHENHYDRAMINE HYDROCHLORIDE 50 MG/ML
50 INJECTION INTRAMUSCULAR; INTRAVENOUS AS NEEDED
Status: DISCONTINUED | OUTPATIENT
Start: 2017-10-13 | End: 2017-10-13 | Stop reason: HOSPADM

## 2017-10-13 RX ADMIN — SODIUM CHLORIDE 250 ML: 9 INJECTION, SOLUTION INTRAVENOUS at 08:01

## 2017-10-13 RX ADMIN — IRON SUCROSE 200 MG: 20 INJECTION, SOLUTION INTRAVENOUS at 08:04

## 2017-10-16 ENCOUNTER — INFUSION (OUTPATIENT)
Dept: ONCOLOGY | Facility: HOSPITAL | Age: 47
End: 2017-10-16

## 2017-10-16 VITALS
OXYGEN SATURATION: 98 % | HEART RATE: 61 BPM | RESPIRATION RATE: 18 BRPM | DIASTOLIC BLOOD PRESSURE: 71 MMHG | BODY MASS INDEX: 36.32 KG/M2 | TEMPERATURE: 97.9 F | SYSTOLIC BLOOD PRESSURE: 119 MMHG | HEIGHT: 66 IN | WEIGHT: 226 LBS

## 2017-10-16 DIAGNOSIS — K90.9 INTESTINAL MALABSORPTION, UNSPECIFIED TYPE: ICD-10-CM

## 2017-10-16 DIAGNOSIS — D50.9 IRON DEFICIENCY ANEMIA, UNSPECIFIED IRON DEFICIENCY ANEMIA TYPE: Primary | ICD-10-CM

## 2017-10-16 PROCEDURE — 25010000002 IRON SUCROSE PER 1 MG: Performed by: INTERNAL MEDICINE

## 2017-10-16 PROCEDURE — 96365 THER/PROPH/DIAG IV INF INIT: CPT

## 2017-10-16 RX ORDER — FAMOTIDINE 10 MG/ML
20 INJECTION, SOLUTION INTRAVENOUS AS NEEDED
Status: DISCONTINUED | OUTPATIENT
Start: 2017-10-16 | End: 2017-10-16 | Stop reason: HOSPADM

## 2017-10-16 RX ORDER — DIPHENHYDRAMINE HYDROCHLORIDE 50 MG/ML
50 INJECTION INTRAMUSCULAR; INTRAVENOUS AS NEEDED
Status: DISCONTINUED | OUTPATIENT
Start: 2017-10-16 | End: 2017-10-16 | Stop reason: HOSPADM

## 2017-10-16 RX ORDER — SODIUM CHLORIDE 9 MG/ML
250 INJECTION, SOLUTION INTRAVENOUS ONCE
Status: COMPLETED | OUTPATIENT
Start: 2017-10-16 | End: 2017-10-16

## 2017-10-16 RX ADMIN — SODIUM CHLORIDE 250 ML: 0.9 INJECTION, SOLUTION INTRAVENOUS at 07:30

## 2017-10-16 RX ADMIN — IRON SUCROSE 200 MG: 20 INJECTION, SOLUTION INTRAVENOUS at 07:37

## 2017-10-18 ENCOUNTER — INFUSION (OUTPATIENT)
Dept: ONCOLOGY | Facility: HOSPITAL | Age: 47
End: 2017-10-18

## 2017-10-18 VITALS
OXYGEN SATURATION: 97 % | HEART RATE: 58 BPM | BODY MASS INDEX: 36.64 KG/M2 | RESPIRATION RATE: 18 BRPM | WEIGHT: 228 LBS | DIASTOLIC BLOOD PRESSURE: 72 MMHG | TEMPERATURE: 97.7 F | HEIGHT: 66 IN | SYSTOLIC BLOOD PRESSURE: 123 MMHG

## 2017-10-18 DIAGNOSIS — D50.9 IRON DEFICIENCY ANEMIA, UNSPECIFIED IRON DEFICIENCY ANEMIA TYPE: ICD-10-CM

## 2017-10-18 DIAGNOSIS — K90.9 INTESTINAL MALABSORPTION, UNSPECIFIED TYPE: Primary | ICD-10-CM

## 2017-10-18 PROCEDURE — 96365 THER/PROPH/DIAG IV INF INIT: CPT

## 2017-10-18 PROCEDURE — 25010000002 IRON SUCROSE PER 1 MG: Performed by: INTERNAL MEDICINE

## 2017-10-18 RX ORDER — SODIUM CHLORIDE 9 MG/ML
250 INJECTION, SOLUTION INTRAVENOUS ONCE
Status: COMPLETED | OUTPATIENT
Start: 2017-10-18 | End: 2017-10-18

## 2017-10-18 RX ADMIN — SODIUM CHLORIDE 250 ML: 9 INJECTION, SOLUTION INTRAVENOUS at 08:08

## 2017-10-18 RX ADMIN — IRON SUCROSE 200 MG: 20 INJECTION, SOLUTION INTRAVENOUS at 08:08

## 2017-10-24 ENCOUNTER — INFUSION (OUTPATIENT)
Dept: ONCOLOGY | Facility: HOSPITAL | Age: 47
End: 2017-10-24

## 2017-10-24 VITALS
HEIGHT: 66 IN | RESPIRATION RATE: 18 BRPM | DIASTOLIC BLOOD PRESSURE: 72 MMHG | SYSTOLIC BLOOD PRESSURE: 124 MMHG | HEART RATE: 66 BPM | BODY MASS INDEX: 36.8 KG/M2 | TEMPERATURE: 97.9 F | WEIGHT: 229 LBS | OXYGEN SATURATION: 97 %

## 2017-10-24 DIAGNOSIS — K90.9 INTESTINAL MALABSORPTION, UNSPECIFIED TYPE: ICD-10-CM

## 2017-10-24 DIAGNOSIS — D50.9 IRON DEFICIENCY ANEMIA, UNSPECIFIED IRON DEFICIENCY ANEMIA TYPE: Primary | ICD-10-CM

## 2017-10-24 PROCEDURE — 25010000002 IRON SUCROSE PER 1 MG: Performed by: INTERNAL MEDICINE

## 2017-10-24 PROCEDURE — 96365 THER/PROPH/DIAG IV INF INIT: CPT

## 2017-10-24 RX ORDER — FAMOTIDINE 10 MG/ML
20 INJECTION, SOLUTION INTRAVENOUS AS NEEDED
Status: DISCONTINUED | OUTPATIENT
Start: 2017-10-24 | End: 2017-10-24 | Stop reason: HOSPADM

## 2017-10-24 RX ORDER — SODIUM CHLORIDE 9 MG/ML
250 INJECTION, SOLUTION INTRAVENOUS ONCE
Status: COMPLETED | OUTPATIENT
Start: 2017-10-24 | End: 2017-10-24

## 2017-10-24 RX ORDER — DIPHENHYDRAMINE HYDROCHLORIDE 50 MG/ML
50 INJECTION INTRAMUSCULAR; INTRAVENOUS AS NEEDED
Status: DISCONTINUED | OUTPATIENT
Start: 2017-10-24 | End: 2017-10-24 | Stop reason: HOSPADM

## 2017-10-24 RX ADMIN — SODIUM CHLORIDE 250 ML: 9 INJECTION, SOLUTION INTRAVENOUS at 07:50

## 2017-10-24 RX ADMIN — IRON SUCROSE 200 MG: 20 INJECTION, SOLUTION INTRAVENOUS at 07:55

## 2017-10-25 ENCOUNTER — INFUSION (OUTPATIENT)
Dept: ONCOLOGY | Facility: HOSPITAL | Age: 47
End: 2017-10-25

## 2017-10-25 VITALS
HEART RATE: 61 BPM | OXYGEN SATURATION: 99 % | WEIGHT: 229 LBS | BODY MASS INDEX: 36.8 KG/M2 | SYSTOLIC BLOOD PRESSURE: 128 MMHG | DIASTOLIC BLOOD PRESSURE: 68 MMHG | RESPIRATION RATE: 18 BRPM | TEMPERATURE: 97.9 F | HEIGHT: 66 IN

## 2017-10-25 DIAGNOSIS — K90.9 INTESTINAL MALABSORPTION, UNSPECIFIED TYPE: ICD-10-CM

## 2017-10-25 DIAGNOSIS — D50.9 IRON DEFICIENCY ANEMIA, UNSPECIFIED IRON DEFICIENCY ANEMIA TYPE: Primary | ICD-10-CM

## 2017-10-25 PROCEDURE — 96365 THER/PROPH/DIAG IV INF INIT: CPT

## 2017-10-25 PROCEDURE — 25010000002 IRON SUCROSE PER 1 MG: Performed by: INTERNAL MEDICINE

## 2017-10-25 RX ORDER — SODIUM CHLORIDE 9 MG/ML
250 INJECTION, SOLUTION INTRAVENOUS ONCE
Status: COMPLETED | OUTPATIENT
Start: 2017-10-25 | End: 2017-10-25

## 2017-10-25 RX ADMIN — SODIUM CHLORIDE 250 ML: 9 INJECTION, SOLUTION INTRAVENOUS at 08:06

## 2017-10-25 RX ADMIN — IRON SUCROSE 200 MG: 20 INJECTION, SOLUTION INTRAVENOUS at 08:08

## 2017-10-26 ENCOUNTER — INFUSION (OUTPATIENT)
Dept: ONCOLOGY | Facility: HOSPITAL | Age: 47
End: 2017-10-26

## 2017-10-26 VITALS
OXYGEN SATURATION: 100 % | TEMPERATURE: 97.6 F | BODY MASS INDEX: 36.96 KG/M2 | HEART RATE: 63 BPM | SYSTOLIC BLOOD PRESSURE: 112 MMHG | HEIGHT: 66 IN | DIASTOLIC BLOOD PRESSURE: 66 MMHG | WEIGHT: 230 LBS | RESPIRATION RATE: 20 BRPM

## 2017-10-26 DIAGNOSIS — K90.9 INTESTINAL MALABSORPTION, UNSPECIFIED TYPE: ICD-10-CM

## 2017-10-26 DIAGNOSIS — D50.9 IRON DEFICIENCY ANEMIA, UNSPECIFIED IRON DEFICIENCY ANEMIA TYPE: Primary | ICD-10-CM

## 2017-10-26 PROCEDURE — 25010000002 IRON SUCROSE PER 1 MG: Performed by: INTERNAL MEDICINE

## 2017-10-26 PROCEDURE — 96365 THER/PROPH/DIAG IV INF INIT: CPT

## 2017-10-26 RX ORDER — FAMOTIDINE 10 MG/ML
20 INJECTION, SOLUTION INTRAVENOUS AS NEEDED
Status: DISCONTINUED | OUTPATIENT
Start: 2017-10-26 | End: 2017-10-26 | Stop reason: HOSPADM

## 2017-10-26 RX ORDER — SODIUM CHLORIDE 9 MG/ML
250 INJECTION, SOLUTION INTRAVENOUS ONCE
Status: COMPLETED | OUTPATIENT
Start: 2017-10-26 | End: 2017-10-26

## 2017-10-26 RX ORDER — DIPHENHYDRAMINE HYDROCHLORIDE 50 MG/ML
50 INJECTION INTRAMUSCULAR; INTRAVENOUS AS NEEDED
Status: DISCONTINUED | OUTPATIENT
Start: 2017-10-26 | End: 2017-10-26 | Stop reason: HOSPADM

## 2017-10-26 RX ADMIN — SODIUM CHLORIDE 250 ML: 9 INJECTION, SOLUTION INTRAVENOUS at 08:20

## 2017-10-26 RX ADMIN — IRON SUCROSE 200 MG: 20 INJECTION, SOLUTION INTRAVENOUS at 08:22

## 2017-10-31 ENCOUNTER — INFUSION (OUTPATIENT)
Dept: ONCOLOGY | Facility: HOSPITAL | Age: 47
End: 2017-10-31

## 2017-10-31 VITALS
RESPIRATION RATE: 18 BRPM | SYSTOLIC BLOOD PRESSURE: 137 MMHG | DIASTOLIC BLOOD PRESSURE: 81 MMHG | HEIGHT: 66 IN | WEIGHT: 227 LBS | TEMPERATURE: 97.9 F | OXYGEN SATURATION: 100 % | BODY MASS INDEX: 36.48 KG/M2 | HEART RATE: 73 BPM

## 2017-10-31 DIAGNOSIS — D50.9 IRON DEFICIENCY ANEMIA, UNSPECIFIED IRON DEFICIENCY ANEMIA TYPE: Primary | ICD-10-CM

## 2017-10-31 DIAGNOSIS — K90.9 INTESTINAL MALABSORPTION, UNSPECIFIED TYPE: ICD-10-CM

## 2017-10-31 PROCEDURE — 96365 THER/PROPH/DIAG IV INF INIT: CPT

## 2017-10-31 PROCEDURE — 25010000002 IRON SUCROSE PER 1 MG: Performed by: INTERNAL MEDICINE

## 2017-10-31 RX ORDER — SODIUM CHLORIDE 9 MG/ML
250 INJECTION, SOLUTION INTRAVENOUS ONCE
Status: COMPLETED | OUTPATIENT
Start: 2017-10-31 | End: 2017-10-31

## 2017-10-31 RX ADMIN — SODIUM CHLORIDE 250 ML: 0.9 INJECTION, SOLUTION INTRAVENOUS at 07:55

## 2017-10-31 RX ADMIN — IRON SUCROSE 200 MG: 20 INJECTION, SOLUTION INTRAVENOUS at 07:55

## 2017-11-01 ENCOUNTER — INFUSION (OUTPATIENT)
Dept: ONCOLOGY | Facility: HOSPITAL | Age: 47
End: 2017-11-01

## 2017-11-01 VITALS
TEMPERATURE: 97.8 F | RESPIRATION RATE: 18 BRPM | WEIGHT: 228 LBS | HEART RATE: 62 BPM | BODY MASS INDEX: 36.64 KG/M2 | DIASTOLIC BLOOD PRESSURE: 76 MMHG | HEIGHT: 66 IN | OXYGEN SATURATION: 99 % | SYSTOLIC BLOOD PRESSURE: 122 MMHG

## 2017-11-01 DIAGNOSIS — D50.9 IRON DEFICIENCY ANEMIA, UNSPECIFIED IRON DEFICIENCY ANEMIA TYPE: Primary | ICD-10-CM

## 2017-11-01 DIAGNOSIS — K90.9 INTESTINAL MALABSORPTION, UNSPECIFIED TYPE: ICD-10-CM

## 2017-11-01 PROCEDURE — 96365 THER/PROPH/DIAG IV INF INIT: CPT

## 2017-11-01 PROCEDURE — 25010000002 IRON SUCROSE PER 1 MG: Performed by: INTERNAL MEDICINE

## 2017-11-01 RX ORDER — SODIUM CHLORIDE 9 MG/ML
250 INJECTION, SOLUTION INTRAVENOUS ONCE
Status: COMPLETED | OUTPATIENT
Start: 2017-11-01 | End: 2017-11-01

## 2017-11-01 RX ADMIN — IRON SUCROSE 200 MG: 20 INJECTION, SOLUTION INTRAVENOUS at 08:07

## 2017-11-01 RX ADMIN — SODIUM CHLORIDE 250 ML: 0.9 INJECTION, SOLUTION INTRAVENOUS at 08:00

## 2017-11-16 DIAGNOSIS — D50.9 IRON DEFICIENCY ANEMIA, UNSPECIFIED IRON DEFICIENCY ANEMIA TYPE: Primary | ICD-10-CM

## 2017-11-20 ENCOUNTER — OFFICE VISIT (OUTPATIENT)
Dept: ONCOLOGY | Facility: CLINIC | Age: 47
End: 2017-11-20

## 2017-11-20 ENCOUNTER — LAB (OUTPATIENT)
Dept: LAB | Facility: HOSPITAL | Age: 47
End: 2017-11-20

## 2017-11-20 VITALS
HEART RATE: 60 BPM | DIASTOLIC BLOOD PRESSURE: 84 MMHG | OXYGEN SATURATION: 98 % | BODY MASS INDEX: 36.96 KG/M2 | TEMPERATURE: 97.5 F | SYSTOLIC BLOOD PRESSURE: 118 MMHG | WEIGHT: 230 LBS | HEIGHT: 66 IN | RESPIRATION RATE: 16 BRPM

## 2017-11-20 DIAGNOSIS — D50.9 IRON DEFICIENCY ANEMIA, UNSPECIFIED IRON DEFICIENCY ANEMIA TYPE: Primary | ICD-10-CM

## 2017-11-20 DIAGNOSIS — D50.9 NORMOCYTIC HYPOCHROMIC ANEMIA: Primary | ICD-10-CM

## 2017-11-20 LAB
ALBUMIN SERPL-MCNC: 3.8 G/DL (ref 3.5–5)
ALBUMIN/GLOB SERPL: 1.2 G/DL (ref 1.1–2.5)
ALP SERPL-CCNC: 68 U/L (ref 24–120)
ALT SERPL W P-5'-P-CCNC: 45 U/L (ref 0–54)
ANION GAP SERPL CALCULATED.3IONS-SCNC: 7 MMOL/L (ref 4–13)
AST SERPL-CCNC: 52 U/L (ref 7–45)
AUTO MIXED CELLS #: 0.5 10*3/MM3 (ref 0.1–2.6)
AUTO MIXED CELLS %: 8.8 % (ref 0.1–24)
BILIRUB SERPL-MCNC: 0.4 MG/DL (ref 0.1–1)
BUN BLD-MCNC: 11 MG/DL (ref 5–21)
BUN/CREAT SERPL: 17.5
CALCIUM SPEC-SCNC: 8.9 MG/DL (ref 8.4–10.4)
CHLORIDE SERPL-SCNC: 106 MMOL/L (ref 98–110)
CO2 SERPL-SCNC: 28 MMOL/L (ref 24–31)
CREAT BLD-MCNC: 0.63 MG/DL (ref 0.5–1.4)
ERYTHROCYTE [DISTWIDTH] IN BLOOD BY AUTOMATED COUNT: 15.4 % (ref 12–15)
FERRITIN SERPL-MCNC: 146 NG/ML (ref 6.24–137)
GFR SERPL CREATININE-BSD FRML MDRD: 101 ML/MIN/1.73
GLOBULIN UR ELPH-MCNC: 3.3 GM/DL
GLUCOSE BLD-MCNC: 86 MG/DL (ref 70–100)
HCT VFR BLD AUTO: 38.9 % (ref 37–47)
HGB BLD-MCNC: 13.2 G/DL (ref 12–16)
HOLD SPECIMEN: NORMAL
IRON 24H UR-MRATE: 69 MCG/DL (ref 42–180)
IRON SATN MFR SERPL: 21 % (ref 20–45)
LYMPHOCYTES # BLD AUTO: 2.7 10*3/MM3 (ref 0.8–7)
LYMPHOCYTES NFR BLD AUTO: 51.4 % (ref 15–45)
MCH RBC QN AUTO: 28.5 PG (ref 28–32)
MCHC RBC AUTO-ENTMCNC: 33.9 G/DL (ref 33–36)
MCV RBC AUTO: 84 FL (ref 82–98)
NEUTROPHILS # BLD AUTO: 2 10*3/MM3 (ref 1.5–8.3)
NEUTROPHILS NFR BLD AUTO: 39.8 % (ref 39–78)
PLATELET # BLD AUTO: 244 10*3/MM3 (ref 130–400)
PMV BLD AUTO: 9.1 FL (ref 6–12)
POTASSIUM BLD-SCNC: 3.7 MMOL/L (ref 3.5–5.3)
PROT SERPL-MCNC: 7.1 G/DL (ref 6.3–8.7)
RBC # BLD AUTO: 4.63 10*6/MM3 (ref 4.2–5.4)
SODIUM BLD-SCNC: 141 MMOL/L (ref 135–145)
TIBC SERPL-MCNC: 334 MCG/DL (ref 225–420)
WBC NRBC COR # BLD: 5.2 10*3/MM3 (ref 4.8–10.8)

## 2017-11-20 PROCEDURE — 99213 OFFICE O/P EST LOW 20 MIN: CPT | Performed by: INTERNAL MEDICINE

## 2017-11-20 PROCEDURE — 83550 IRON BINDING TEST: CPT | Performed by: INTERNAL MEDICINE

## 2017-11-20 PROCEDURE — 36415 COLL VENOUS BLD VENIPUNCTURE: CPT

## 2017-11-20 PROCEDURE — 83540 ASSAY OF IRON: CPT | Performed by: INTERNAL MEDICINE

## 2017-11-20 PROCEDURE — 80053 COMPREHEN METABOLIC PANEL: CPT

## 2017-11-20 PROCEDURE — 85025 COMPLETE CBC W/AUTO DIFF WBC: CPT

## 2017-11-20 PROCEDURE — 82728 ASSAY OF FERRITIN: CPT | Performed by: INTERNAL MEDICINE

## 2017-11-20 NOTE — PROGRESS NOTES
[facility]  HEMATOLOGY & ONCOLOGY    Cancer Staging Information:  No matching staging information was found for the patient.      Subjective     VISIT DIAGNOSIS:   Encounter Diagnosis   Name Primary?   • Iron deficiency anemia, unspecified iron deficiency anemia type Yes       REASON FOR VISIT:     Chief Complaint   Patient presents with   • Anemia     Follow-up        HEMATOLOGY / ONCOLOGY HISTORY:   Oncology/Hematology History    46-year-old patient history of hematuria, she was referred to urology for workup for hematuria.  Last showed PFA abnormal with collagen epinephrine binding greater than 300.  Late left 272, ProTime 12.5 normal INR of 0.9..  She denies nosebleeds, or hemoptysis.  She denies family history of bleeding.  She had a hysterectomy due to bleeding fibroids.  She denies  easy bruising.  No lymphadenopathy.             INTERVAL HISTORY  Patient ID: Kathy Meadows is a 47 y.o. year old female referred  due to abnormal platelet function tests.  Workup showed so far shows normal von Willebrand screening, iron deficiency anemia, negative for multiple, myeloma, normal folate and B12 .  She has normal bilirubin so this rules out hemolysis.    Past Medical History:   Past Medical History:   Diagnosis Date   • Abnormal platelet function test 9/27/2017   • Hematuria 9/27/2017   • Iron deficiency anemia 10/11/2017     Past Surgical History:   Past Surgical History:   Procedure Laterality Date   • BREAST AUGMENTATION     • CHOLECYSTECTOMY     • HYSTERECTOMY       Social History:   Social History     Social History   • Marital status:      Spouse name: N/A   • Number of children: N/A   • Years of education: N/A     Occupational History   • Not on file.     Social History Main Topics   • Smoking status: Never Smoker   • Smokeless tobacco: Never Used   • Alcohol use No   • Drug use: No   • Sexual activity: Defer     Other Topics Concern   • Not on file     Social History Narrative  "    Family History:   Family History   Problem Relation Age of Onset   • Stroke Father        Review of Systems   Constitutional:        Chronic low fatigue.   HENT: Negative.    Eyes: Negative.    Respiratory: Negative.    Gastrointestinal: Negative.    Endocrine: Negative.    Genitourinary: Positive for hematuria.   Musculoskeletal: Negative.    Skin: Negative.    Allergic/Immunologic: Negative.    Neurological: Negative.    Hematological: Negative.    Psychiatric/Behavioral: Negative.         Performance Status:  Asymptomatic    Medications:    Current Outpatient Prescriptions   Medication Sig Dispense Refill   • B Complex-C-E-Zn (B COMPLEX-C-E-ZINC) tablet Take 1 tablet by mouth Daily.     • Biotin 1000 MCG tablet Take 1,000 mcg by mouth 3 (Three) Times a Day.     • famotidine (PEPCID) 40 MG tablet TK 1 T PO D HS  2   • magnesium oxide (MAGOX) 400 (241.3 Mg) MG tablet tablet Take 400 mg by mouth Daily.     • metoprolol succinate XL (TOPROL-XL) 25 MG 24 hr tablet TK 1 T PO ONCE A DAY. FOR HIGH BP  2   • omeprazole (priLOSEC) 40 MG capsule TK ONE C PO BID  2   • traZODone (DESYREL) 100 MG tablet TK 1 T PO HS. TO IMPROVE QUALITY OF SLEEP  2   • vitamin D (ERGOCALCIFEROL) 44249 units capsule capsule TK ONE C PO ONCE A WEEK  2     No current facility-administered medications for this visit.        ALLERGIES:  No Known Allergies    Objective      Vitals:    11/20/17 1448   BP: 118/84   Pulse: 60   Resp: 16   Temp: 97.5 °F (36.4 °C)   TempSrc: Tympanic   SpO2: 98%   Weight: 230 lb (104 kg)   Height: 66\" (167.6 cm)         Current Status 11/20/2017   ECOG score 0         Physical Exam  General Appearance: Patient is awake, alert, oriented and in no acute distress. Patient is welldeveloped, wellnourished, and appears stated age.  HEENT: Normocephalic. Sclerae clear, conjunctiva pink, extraocular movements intact, pupils, round, reactive to light and  accommodation. Telangiectasia of the upper lip. Mouth and throat are " clear with moist oral mucosa.  NECK: Supple, no jugular venous distention, thyroid not enlarged.  LYMPH: No cervical, supraclavicular, axillary, or inguinal lymphadenopathy.  CHEST: Equal bilateral expansion, AP  diameter normal, resonant percussion note  LUNGS: Good air movement, no rales, rhonchi, rubs or wheezes with auscultation  CARDIO: Regular sinus rhythm, no murmurs, gallops or rubs.  ABDOMEN: Nondistended, soft, No tenderness, no guarding, no rebound, No hepatosplenomegaly. No abdominal masses. Bowel sounds positive. No hernia  GENITALIA: Not examined.  BREASTS: Not examined.  MUSKEL: No joint swelling, decreased motion, or inflammation  EXTREMS: No edema, clubbing, cyanosis, No varicose veins.  NEURO: Grossly nonfocal, Gait is coordinated and smooth, Cognition is preserved.  SKIN: No rashes, no ecchymoses, no petechia.  PSYCH: Oriented to time, place and person. Memory is preserved. Mood and affect appear normal  RECENT LABS:  Orders Only on 11/20/2017   Component Date Value Ref Range Status   • Iron 11/20/2017 69  42 - 180 mcg/dL Final   • TIBC 11/20/2017 334  225 - 420 mcg/dL Final   • Iron Saturation 11/20/2017 21  20 - 45 % Final   • Ferritin 11/20/2017 146.00* 6.24 - 137.00 ng/mL Final   Lab on 11/20/2017   Component Date Value Ref Range Status   • Glucose 11/20/2017 86  70 - 100 mg/dL Final   • BUN 11/20/2017 11  5 - 21 mg/dL Final   • Creatinine 11/20/2017 0.63  0.50 - 1.40 mg/dL Final   • Sodium 11/20/2017 141  135 - 145 mmol/L Final   • Potassium 11/20/2017 3.7  3.5 - 5.3 mmol/L Final   • Chloride 11/20/2017 106  98 - 110 mmol/L Final   • CO2 11/20/2017 28.0  24.0 - 31.0 mmol/L Final   • Calcium 11/20/2017 8.9  8.4 - 10.4 mg/dL Final   • Total Protein 11/20/2017 7.1  6.3 - 8.7 g/dL Final   • Albumin 11/20/2017 3.80  3.50 - 5.00 g/dL Final   • ALT (SGPT) 11/20/2017 45  0 - 54 U/L Final   • AST (SGOT) 11/20/2017 52* 7 - 45 U/L Final   • Alkaline Phosphatase 11/20/2017 68  24 - 120 U/L Final   •  Total Bilirubin 11/20/2017 0.4  0.1 - 1.0 mg/dL Final   • eGFR Non African Amer 11/20/2017 101  >60 mL/min/1.73 Final   • Globulin 11/20/2017 3.3  gm/dL Final   • A/G Ratio 11/20/2017 1.2  1.1 - 2.5 g/dL Final   • BUN/Creatinine Ratio 11/20/2017 17.5    Final   • Anion Gap 11/20/2017 7.0  4.0 - 13.0 mmol/L Final   • WBC 11/20/2017 5.20  4.80 - 10.80 10*3/mm3 Final   • RBC 11/20/2017 4.63  4.20 - 5.40 10*6/mm3 Final   • Hemoglobin 11/20/2017 13.2  12.0 - 16.0 g/dL Final   • Hematocrit 11/20/2017 38.9  37.0 - 47.0 % Final   • MCV 11/20/2017 84.0  82.0 - 98.0 fL Final   • MCH 11/20/2017 28.5  28.0 - 32.0 pg Final   • MCHC 11/20/2017 33.9  33.0 - 36.0 g/dL Final   • RDW 11/20/2017 15.4* 12.0 - 15.0 % Final   • MPV 11/20/2017 9.1  6.0 - 12.0 fL Final   • Platelets 11/20/2017 244  130 - 400 10*3/mm3 Final   • Neutrophil % 11/20/2017 39.8  39.0 - 78.0 % Final   • Lymphocyte % 11/20/2017 51.4* 15.0 - 45.0 % Final   • Auto Mixed Cells % 11/20/2017 8.8  0.1 - 24.0 % Final   • Neutrophils, Absolute 11/20/2017 2.00  1.50 - 8.30 10*3/mm3 Final   • Lymphocytes, Absolute 11/20/2017 2.70  0.80 - 7.00 10*3/mm3 Final   • Auto Mixed Cells # 11/20/2017 0.50  0.10 - 2.60 10*3/mm3 Final   • Extra Tube 11/20/2017 Hold for add-ons.   Final    Auto resulted.       RADIOLOGY:  No results found.         Assessment/Plan  Kathy Meadows is a 47 y.o. year old female referred due to abnormal PFA studies.    Patient Active Problem List   Diagnosis   • Abnormal platelet function test   • Hematuria   • Iron deficiency anemia   • Intestinal malabsorption          1.Hematuria: Platelet function test shows prolonged collagen epinephrine binding. Collagen ADP binding >300 .  Thus far von Willebrand screening is negative.  In addition she is anemic.  Causes of abnormal platelet binding are anemia versus drug-induced platelet dysfunction versus von Willebrand disease.  We will look to correct her anemia, abnormal Collagen ADP binding rules out NSAID use.  Suspect her abnormal PFA is due to anemia.    2.  Anemia: Iron deficiency anemia due to hematuria versus GI blood loss versus lack of absorption.  We will replace by IV. Also she does have visible telangiectasia on her upper lips, am wondering if she has hereditary hemorrhagic telangiectasias (HHT). Patient will be evaluated by GI for EGD and colonoscopy to rule out GI blood loss.          Milvia Bhatti MD    11/20/2017    11:36 AM

## 2017-12-15 DIAGNOSIS — D50.9 NORMOCYTIC HYPOCHROMIC ANEMIA: Primary | ICD-10-CM

## 2017-12-20 ENCOUNTER — OFFICE VISIT (OUTPATIENT)
Dept: ONCOLOGY | Facility: CLINIC | Age: 47
End: 2017-12-20

## 2017-12-20 ENCOUNTER — LAB (OUTPATIENT)
Dept: LAB | Facility: HOSPITAL | Age: 47
End: 2017-12-20

## 2017-12-20 VITALS
RESPIRATION RATE: 18 BRPM | SYSTOLIC BLOOD PRESSURE: 130 MMHG | HEIGHT: 66 IN | OXYGEN SATURATION: 98 % | BODY MASS INDEX: 37.33 KG/M2 | TEMPERATURE: 97.1 F | HEART RATE: 78 BPM | WEIGHT: 232.3 LBS | DIASTOLIC BLOOD PRESSURE: 74 MMHG

## 2017-12-20 DIAGNOSIS — K90.9 INTESTINAL MALABSORPTION, UNSPECIFIED TYPE: ICD-10-CM

## 2017-12-20 DIAGNOSIS — D50.0 IRON DEFICIENCY ANEMIA DUE TO CHRONIC BLOOD LOSS: Primary | ICD-10-CM

## 2017-12-20 DIAGNOSIS — D50.9 IRON DEFICIENCY ANEMIA, UNSPECIFIED IRON DEFICIENCY ANEMIA TYPE: ICD-10-CM

## 2017-12-20 DIAGNOSIS — D50.9 IRON DEFICIENCY ANEMIA, UNSPECIFIED IRON DEFICIENCY ANEMIA TYPE: Primary | ICD-10-CM

## 2017-12-20 LAB
ALBUMIN SERPL-MCNC: 3.9 G/DL (ref 3.5–5)
ALBUMIN/GLOB SERPL: 1.2 G/DL (ref 1.1–2.5)
ALP SERPL-CCNC: 69 U/L (ref 24–120)
ALT SERPL W P-5'-P-CCNC: 55 U/L (ref 0–54)
ANION GAP SERPL CALCULATED.3IONS-SCNC: 10 MMOL/L (ref 4–13)
AST SERPL-CCNC: 43 U/L (ref 7–45)
AUTO MIXED CELLS #: 0.5 10*3/MM3 (ref 0.1–2.6)
AUTO MIXED CELLS %: 8.5 % (ref 0.1–24)
BILIRUB SERPL-MCNC: 0.6 MG/DL (ref 0.1–1)
BUN BLD-MCNC: 15 MG/DL (ref 5–21)
BUN/CREAT SERPL: 23.8
CALCIUM SPEC-SCNC: 9 MG/DL (ref 8.4–10.4)
CHLORIDE SERPL-SCNC: 105 MMOL/L (ref 98–110)
CO2 SERPL-SCNC: 26 MMOL/L (ref 24–31)
CREAT BLD-MCNC: 0.63 MG/DL (ref 0.5–1.4)
ERYTHROCYTE [DISTWIDTH] IN BLOOD BY AUTOMATED COUNT: 14.9 % (ref 12–15)
GFR SERPL CREATININE-BSD FRML MDRD: 101 ML/MIN/1.73
GLOBULIN UR ELPH-MCNC: 3.3 GM/DL
GLUCOSE BLD-MCNC: 119 MG/DL (ref 70–100)
HCT VFR BLD AUTO: 37.5 % (ref 37–47)
HGB BLD-MCNC: 12.9 G/DL (ref 12–16)
HOLD SPECIMEN: NORMAL
LYMPHOCYTES # BLD AUTO: 2.6 10*3/MM3 (ref 0.8–7)
LYMPHOCYTES NFR BLD AUTO: 44.7 % (ref 15–45)
MCH RBC QN AUTO: 29.2 PG (ref 28–32)
MCHC RBC AUTO-ENTMCNC: 34.4 G/DL (ref 33–36)
MCV RBC AUTO: 84.8 FL (ref 82–98)
NEUTROPHILS # BLD AUTO: 2.8 10*3/MM3 (ref 1.5–8.3)
NEUTROPHILS NFR BLD AUTO: 46.8 % (ref 39–78)
PLATELET # BLD AUTO: 217 10*3/MM3 (ref 130–400)
PMV BLD AUTO: 9.1 FL (ref 6–12)
POTASSIUM BLD-SCNC: 3.4 MMOL/L (ref 3.5–5.3)
PROT SERPL-MCNC: 7.2 G/DL (ref 6.3–8.7)
RBC # BLD AUTO: 4.42 10*6/MM3 (ref 4.2–5.4)
SODIUM BLD-SCNC: 141 MMOL/L (ref 135–145)
WBC NRBC COR # BLD: 5.9 10*3/MM3 (ref 4.8–10.8)

## 2017-12-20 PROCEDURE — 80053 COMPREHEN METABOLIC PANEL: CPT | Performed by: INTERNAL MEDICINE

## 2017-12-20 PROCEDURE — 36415 COLL VENOUS BLD VENIPUNCTURE: CPT

## 2017-12-20 PROCEDURE — 85025 COMPLETE CBC W/AUTO DIFF WBC: CPT | Performed by: INTERNAL MEDICINE

## 2017-12-20 PROCEDURE — 99213 OFFICE O/P EST LOW 20 MIN: CPT | Performed by: INTERNAL MEDICINE

## 2017-12-20 RX ORDER — SODIUM CHLORIDE 9 MG/ML
250 INJECTION, SOLUTION INTRAVENOUS ONCE
Status: CANCELLED | OUTPATIENT
Start: 2017-12-22

## 2017-12-20 RX ORDER — SODIUM CHLORIDE 9 MG/ML
250 INJECTION, SOLUTION INTRAVENOUS ONCE
Status: CANCELLED | OUTPATIENT
Start: 2017-12-21

## 2017-12-20 RX ORDER — FAMOTIDINE 10 MG/ML
20 INJECTION, SOLUTION INTRAVENOUS AS NEEDED
Status: CANCELLED | OUTPATIENT
Start: 2017-12-26

## 2017-12-20 RX ORDER — MEPERIDINE HYDROCHLORIDE 50 MG/ML
25 INJECTION INTRAMUSCULAR; INTRAVENOUS; SUBCUTANEOUS
Status: CANCELLED | OUTPATIENT
Start: 2017-12-21

## 2017-12-20 RX ORDER — DIPHENHYDRAMINE HYDROCHLORIDE 50 MG/ML
50 INJECTION INTRAMUSCULAR; INTRAVENOUS AS NEEDED
Status: CANCELLED | OUTPATIENT
Start: 2017-12-27

## 2017-12-20 RX ORDER — FAMOTIDINE 10 MG/ML
20 INJECTION, SOLUTION INTRAVENOUS AS NEEDED
Status: CANCELLED | OUTPATIENT
Start: 2017-12-21

## 2017-12-20 RX ORDER — DIPHENHYDRAMINE HYDROCHLORIDE 50 MG/ML
50 INJECTION INTRAMUSCULAR; INTRAVENOUS AS NEEDED
Status: CANCELLED | OUTPATIENT
Start: 2017-12-26

## 2017-12-20 RX ORDER — MEPERIDINE HYDROCHLORIDE 50 MG/ML
25 INJECTION INTRAMUSCULAR; INTRAVENOUS; SUBCUTANEOUS
Status: CANCELLED | OUTPATIENT
Start: 2017-12-27

## 2017-12-20 RX ORDER — MEPERIDINE HYDROCHLORIDE 50 MG/ML
25 INJECTION INTRAMUSCULAR; INTRAVENOUS; SUBCUTANEOUS
Status: CANCELLED | OUTPATIENT
Start: 2017-12-22

## 2017-12-20 RX ORDER — MEPERIDINE HYDROCHLORIDE 50 MG/ML
25 INJECTION INTRAMUSCULAR; INTRAVENOUS; SUBCUTANEOUS
Status: CANCELLED | OUTPATIENT
Start: 2017-12-26

## 2017-12-20 RX ORDER — FAMOTIDINE 10 MG/ML
20 INJECTION, SOLUTION INTRAVENOUS AS NEEDED
Status: CANCELLED | OUTPATIENT
Start: 2017-12-22

## 2017-12-20 RX ORDER — DIPHENHYDRAMINE HYDROCHLORIDE 50 MG/ML
50 INJECTION INTRAMUSCULAR; INTRAVENOUS AS NEEDED
Status: CANCELLED | OUTPATIENT
Start: 2017-12-22

## 2017-12-20 RX ORDER — FAMOTIDINE 10 MG/ML
20 INJECTION, SOLUTION INTRAVENOUS AS NEEDED
Status: CANCELLED | OUTPATIENT
Start: 2017-12-27

## 2017-12-20 RX ORDER — DIPHENHYDRAMINE HYDROCHLORIDE 50 MG/ML
50 INJECTION INTRAMUSCULAR; INTRAVENOUS AS NEEDED
Status: CANCELLED | OUTPATIENT
Start: 2017-12-21

## 2017-12-20 RX ORDER — SODIUM CHLORIDE 9 MG/ML
250 INJECTION, SOLUTION INTRAVENOUS ONCE
Status: CANCELLED | OUTPATIENT
Start: 2017-12-27

## 2017-12-20 RX ORDER — SODIUM CHLORIDE 9 MG/ML
250 INJECTION, SOLUTION INTRAVENOUS ONCE
Status: CANCELLED | OUTPATIENT
Start: 2017-12-26

## 2017-12-20 NOTE — PROGRESS NOTES
[facility]  HEMATOLOGY & ONCOLOGY    Cancer Staging Information:  No matching staging information was found for the patient.      Subjective     VISIT DIAGNOSIS:   No diagnosis found.    REASON FOR VISIT:     Chief Complaint   Patient presents with   • Follow-up     Anemia: She is here for f/u visit today and to review her labs        HEMATOLOGY / ONCOLOGY HISTORY:   Oncology/Hematology History    46-year-old patient history of hematuria, she was referred to urology for workup for hematuria.  Last showed PFA abnormal with collagen epinephrine binding greater than 300.  Late left 272, ProTime 12.5 normal INR of 0.9..  She denies nosebleeds, or hemoptysis.  She denies family history of bleeding.  She had a hysterectomy due to bleeding fibroids.  She denies  easy bruising.  No lymphadenopathy.             INTERVAL HISTORY  Patient ID: Kathy Meadows is a 47 y.o. year old female referred  due to abnormal platelet function tests.  Workup showed so far shows normal von Willebrand screening, iron deficiency anemia, negative for multiple, myeloma, normal folate and B12 .  She has normal bilirubin so this rules out hemolysis.  --The patient has no issues or complains today.  Underwent EGD and colonoscopy.  She will also down everything came out fine.  Does not remember follow-up colonoscopy time.  She will call to obtain that.    Past Medical History:   Past Medical History:   Diagnosis Date   • Abnormal platelet function test 9/27/2017   • Hematuria 9/27/2017   • Iron deficiency anemia 10/11/2017     Past Surgical History:   Past Surgical History:   Procedure Laterality Date   • BREAST AUGMENTATION     • CHOLECYSTECTOMY     • HYSTERECTOMY       Social History:   Social History     Social History   • Marital status:      Spouse name: N/A   • Number of children: N/A   • Years of education: N/A     Occupational History   • Not on file.     Social History Main Topics   • Smoking status: Never Smoker   •  "Smokeless tobacco: Never Used   • Alcohol use No   • Drug use: No   • Sexual activity: Defer     Other Topics Concern   • Not on file     Social History Narrative     Family History:   Family History   Problem Relation Age of Onset   • Stroke Father        Review of Systems   Constitutional:        Chronic low fatigue.   HENT: Negative.    Eyes: Negative.    Respiratory: Negative.    Gastrointestinal: Negative.    Endocrine: Negative.    Genitourinary: Positive for hematuria.   Musculoskeletal: Negative.    Skin: Negative.    Allergic/Immunologic: Negative.    Neurological: Negative.    Hematological: Negative.    Psychiatric/Behavioral: Negative.         Performance Status:  Asymptomatic    Medications:    Current Outpatient Prescriptions   Medication Sig Dispense Refill   • B Complex-C-E-Zn (B COMPLEX-C-E-ZINC) tablet Take 1 tablet by mouth Daily.     • famotidine (PEPCID) 40 MG tablet TK 1 T PO D HS  2   • magnesium oxide (MAGOX) 400 (241.3 Mg) MG tablet tablet Take 400 mg by mouth Daily.     • omeprazole (priLOSEC) 40 MG capsule TK ONE C PO BID  2   • traZODone (DESYREL) 100 MG tablet TK 1 T PO HS. TO IMPROVE QUALITY OF SLEEP  2   • vitamin D (ERGOCALCIFEROL) 61171 units capsule capsule TK ONE C PO ONCE A WEEK  2   • Biotin 1000 MCG tablet Take 1,000 mcg by mouth 3 (Three) Times a Day.     • metoprolol succinate XL (TOPROL-XL) 25 MG 24 hr tablet TK 1 T PO ONCE A DAY. FOR HIGH BP  2     No current facility-administered medications for this visit.        ALLERGIES:  No Known Allergies    Objective      Vitals:    12/20/17 1441   BP: 130/74   Pulse: 78   Resp: 18   Temp: 97.1 °F (36.2 °C)   TempSrc: Tympanic   SpO2: 98%   Weight: 105 kg (232 lb 4.8 oz)   Height: 167.6 cm (66\")         Current Status 12/20/2017   ECOG score 0         Physical Exam    General Appearance: Patient is awake, alert, oriented and in no acute distress. Patient is welldeveloped, wellnourished, and appears stated age.  HEENT: Normocephalic. " Sclerae clear, conjunctiva pink, extraocular movements intact, pupils, round, reactive to light and  accommodation. Telangiectasia of the upper lip. Mouth and throat are clear with moist oral mucosa.  NECK: Supple, no jugular venous distention, thyroid not enlarged.  LYMPH: No cervical, supraclavicular, axillary, or inguinal lymphadenopathy.  CHEST: Equal bilateral expansion, AP  diameter normal, resonant percussion note  LUNGS: Good air movement, no rales, rhonchi, rubs or wheezes with auscultation  CARDIO: Regular sinus rhythm, no murmurs, gallops or rubs.  ABDOMEN: Nondistended, soft, No tenderness, no guarding, no rebound, No hepatosplenomegaly. No abdominal masses. Bowel sounds positive. No hernia  GENITALIA: Not examined.  BREASTS: Not examined.  MUSKEL: No joint swelling, decreased motion, or inflammation  EXTREMS: No edema, clubbing, cyanosis, No varicose veins.  NEURO: Grossly nonfocal, Gait is coordinated and smooth, Cognition is preserved.  SKIN: No rashes, no ecchymoses, no petechia.  PSYCH: Oriented to time, place and person. Memory is preserved. Mood and affect appear normal  RECENT LABS:  Orders Only on 12/15/2017   Component Date Value Ref Range Status   • WBC 12/20/2017 5.90  4.80 - 10.80 10*3/mm3 Final   • RBC 12/20/2017 4.42  4.20 - 5.40 10*6/mm3 Final   • Hemoglobin 12/20/2017 12.9  12.0 - 16.0 g/dL Final   • Hematocrit 12/20/2017 37.5  37.0 - 47.0 % Final   • MCV 12/20/2017 84.8  82.0 - 98.0 fL Final   • MCH 12/20/2017 29.2  28.0 - 32.0 pg Final   • MCHC 12/20/2017 34.4  33.0 - 36.0 g/dL Final   • RDW 12/20/2017 14.9  12.0 - 15.0 % Final   • MPV 12/20/2017 9.1  6.0 - 12.0 fL Final   • Platelets 12/20/2017 217  130 - 400 10*3/mm3 Final   • Neutrophil % 12/20/2017 46.8  39.0 - 78.0 % Final   • Lymphocyte % 12/20/2017 44.7  15.0 - 45.0 % Final   • Auto Mixed Cells % 12/20/2017 8.5  0.1 - 24.0 % Final   • Neutrophils, Absolute 12/20/2017 2.80  1.50 - 8.30 10*3/mm3 Final   • Lymphocytes, Absolute  12/20/2017 2.60  0.80 - 7.00 10*3/mm3 Final   • Auto Mixed Cells # 12/20/2017 0.50  0.10 - 2.60 10*3/mm3 Final       RADIOLOGY:  No results found.         Assessment/Plan  Kathy Meadows is a 47 y.o. year old female referred due to abnormal PFA studies and workup revealed severe iron deficiency anemia.    Patient Active Problem List   Diagnosis   • Abnormal platelet function test   • Hematuria   • Iron deficiency anemia   • Intestinal malabsorption          1.Hematuria: Platelet function test shows prolonged collagen epinephrine binding. Collagen ADP binding >300 .  Thus far von Willebrand screening is negative.  In addition she is anemic.  Causes of abnormal platelet binding are anemia versus drug-induced platelet dysfunction versus von Willebrand disease.  We will look to correct her anemia, abnormal Collagen ADP binding rules out NSAID use. Suspect her abnormal PFA is due to anemia.  From hematologic standpoint okay for her to undergo cystoscopy or any other procedure necessary for evaluation of her hematuria.    2.  Anemia: Iron deficiency anemia due to hematuria versus GI blood loss versus lack of absorption.    -- The patient underwent EGD and colonoscopy.  There is evidence of hiatal hernia, Fields esophagus and benign esophageal stricture.  The transverse colon had polyp with mild dysplastic area.  --telangiectasia on her upper lips, am wondering if she has hereditary hemorrhagic telangiectasias (HHT).   -- Plan for additional 4 doses of Venofer to ensure adequate bone marrow storage of iron.  RTC in 3 months.          Milvia Bhatti MD    12/20/2017    2:45 PM

## 2017-12-21 ENCOUNTER — INFUSION (OUTPATIENT)
Dept: ONCOLOGY | Facility: HOSPITAL | Age: 47
End: 2017-12-21
Attending: INTERNAL MEDICINE

## 2017-12-21 VITALS
DIASTOLIC BLOOD PRESSURE: 77 MMHG | OXYGEN SATURATION: 100 % | BODY MASS INDEX: 37.12 KG/M2 | WEIGHT: 231 LBS | TEMPERATURE: 97.7 F | HEART RATE: 76 BPM | SYSTOLIC BLOOD PRESSURE: 123 MMHG | RESPIRATION RATE: 18 BRPM | HEIGHT: 66 IN

## 2017-12-21 DIAGNOSIS — K90.9 INTESTINAL MALABSORPTION, UNSPECIFIED TYPE: ICD-10-CM

## 2017-12-21 DIAGNOSIS — D50.9 IRON DEFICIENCY ANEMIA, UNSPECIFIED IRON DEFICIENCY ANEMIA TYPE: Primary | ICD-10-CM

## 2017-12-21 PROCEDURE — 25010000002 IRON SUCROSE PER 1 MG: Performed by: INTERNAL MEDICINE

## 2017-12-21 PROCEDURE — 96365 THER/PROPH/DIAG IV INF INIT: CPT

## 2017-12-21 RX ORDER — DIPHENHYDRAMINE HYDROCHLORIDE 50 MG/ML
50 INJECTION INTRAMUSCULAR; INTRAVENOUS AS NEEDED
Status: DISCONTINUED | OUTPATIENT
Start: 2017-12-21 | End: 2017-12-21 | Stop reason: HOSPADM

## 2017-12-21 RX ORDER — FAMOTIDINE 10 MG/ML
20 INJECTION, SOLUTION INTRAVENOUS AS NEEDED
Status: DISCONTINUED | OUTPATIENT
Start: 2017-12-21 | End: 2017-12-21 | Stop reason: HOSPADM

## 2017-12-21 RX ORDER — SODIUM CHLORIDE 9 MG/ML
250 INJECTION, SOLUTION INTRAVENOUS ONCE
Status: COMPLETED | OUTPATIENT
Start: 2017-12-21 | End: 2017-12-21

## 2017-12-21 RX ORDER — MEPERIDINE HYDROCHLORIDE 25 MG/ML
25 INJECTION INTRAMUSCULAR; INTRAVENOUS; SUBCUTANEOUS
Status: DISCONTINUED | OUTPATIENT
Start: 2017-12-21 | End: 2017-12-21 | Stop reason: HOSPADM

## 2017-12-21 RX ADMIN — IRON SUCROSE 200 MG: 20 INJECTION, SOLUTION INTRAVENOUS at 09:30

## 2017-12-21 RX ADMIN — SODIUM CHLORIDE 250 ML: 9 INJECTION, SOLUTION INTRAVENOUS at 09:05

## 2017-12-22 ENCOUNTER — INFUSION (OUTPATIENT)
Dept: ONCOLOGY | Facility: HOSPITAL | Age: 47
End: 2017-12-22
Attending: INTERNAL MEDICINE

## 2017-12-22 VITALS
HEART RATE: 62 BPM | HEIGHT: 66 IN | WEIGHT: 230 LBS | BODY MASS INDEX: 36.96 KG/M2 | DIASTOLIC BLOOD PRESSURE: 82 MMHG | TEMPERATURE: 97.6 F | RESPIRATION RATE: 18 BRPM | SYSTOLIC BLOOD PRESSURE: 134 MMHG | OXYGEN SATURATION: 95 %

## 2017-12-22 DIAGNOSIS — D50.9 IRON DEFICIENCY ANEMIA, UNSPECIFIED IRON DEFICIENCY ANEMIA TYPE: Primary | ICD-10-CM

## 2017-12-22 DIAGNOSIS — K90.9 INTESTINAL MALABSORPTION, UNSPECIFIED TYPE: ICD-10-CM

## 2017-12-22 PROCEDURE — 25010000002 IRON SUCROSE PER 1 MG: Performed by: INTERNAL MEDICINE

## 2017-12-22 PROCEDURE — 96365 THER/PROPH/DIAG IV INF INIT: CPT

## 2017-12-22 RX ORDER — SODIUM CHLORIDE 9 MG/ML
250 INJECTION, SOLUTION INTRAVENOUS ONCE
Status: COMPLETED | OUTPATIENT
Start: 2017-12-22 | End: 2017-12-22

## 2017-12-22 RX ORDER — FAMOTIDINE 10 MG/ML
20 INJECTION, SOLUTION INTRAVENOUS AS NEEDED
Status: DISCONTINUED | OUTPATIENT
Start: 2017-12-22 | End: 2017-12-22 | Stop reason: HOSPADM

## 2017-12-22 RX ORDER — MEPERIDINE HYDROCHLORIDE 25 MG/ML
25 INJECTION INTRAMUSCULAR; INTRAVENOUS; SUBCUTANEOUS
Status: DISCONTINUED | OUTPATIENT
Start: 2017-12-22 | End: 2017-12-22 | Stop reason: HOSPADM

## 2017-12-22 RX ORDER — DIPHENHYDRAMINE HYDROCHLORIDE 50 MG/ML
50 INJECTION INTRAMUSCULAR; INTRAVENOUS AS NEEDED
Status: DISCONTINUED | OUTPATIENT
Start: 2017-12-22 | End: 2017-12-22 | Stop reason: HOSPADM

## 2017-12-22 RX ADMIN — IRON SUCROSE 200 MG: 20 INJECTION, SOLUTION INTRAVENOUS at 09:13

## 2017-12-22 RX ADMIN — SODIUM CHLORIDE 250 ML: 9 INJECTION, SOLUTION INTRAVENOUS at 09:13

## 2017-12-26 ENCOUNTER — INFUSION (OUTPATIENT)
Dept: ONCOLOGY | Facility: HOSPITAL | Age: 47
End: 2017-12-26
Attending: INTERNAL MEDICINE

## 2017-12-26 VITALS
RESPIRATION RATE: 16 BRPM | DIASTOLIC BLOOD PRESSURE: 80 MMHG | SYSTOLIC BLOOD PRESSURE: 132 MMHG | TEMPERATURE: 97.8 F | OXYGEN SATURATION: 99 % | WEIGHT: 235 LBS | BODY MASS INDEX: 37.77 KG/M2 | HEART RATE: 61 BPM | HEIGHT: 66 IN

## 2017-12-26 DIAGNOSIS — D50.9 IRON DEFICIENCY ANEMIA, UNSPECIFIED IRON DEFICIENCY ANEMIA TYPE: Primary | ICD-10-CM

## 2017-12-26 DIAGNOSIS — K90.9 INTESTINAL MALABSORPTION, UNSPECIFIED TYPE: ICD-10-CM

## 2017-12-26 PROCEDURE — 25010000002 IRON SUCROSE PER 1 MG: Performed by: INTERNAL MEDICINE

## 2017-12-26 PROCEDURE — 96365 THER/PROPH/DIAG IV INF INIT: CPT

## 2017-12-26 RX ORDER — MEPERIDINE HYDROCHLORIDE 25 MG/ML
25 INJECTION INTRAMUSCULAR; INTRAVENOUS; SUBCUTANEOUS
Status: DISCONTINUED | OUTPATIENT
Start: 2017-12-26 | End: 2017-12-26 | Stop reason: HOSPADM

## 2017-12-26 RX ORDER — DIPHENHYDRAMINE HYDROCHLORIDE 50 MG/ML
50 INJECTION INTRAMUSCULAR; INTRAVENOUS AS NEEDED
Status: DISCONTINUED | OUTPATIENT
Start: 2017-12-26 | End: 2017-12-26 | Stop reason: HOSPADM

## 2017-12-26 RX ORDER — SODIUM CHLORIDE 9 MG/ML
250 INJECTION, SOLUTION INTRAVENOUS ONCE
Status: COMPLETED | OUTPATIENT
Start: 2017-12-26 | End: 2017-12-26

## 2017-12-26 RX ORDER — FAMOTIDINE 10 MG/ML
20 INJECTION, SOLUTION INTRAVENOUS AS NEEDED
Status: DISCONTINUED | OUTPATIENT
Start: 2017-12-26 | End: 2017-12-26 | Stop reason: HOSPADM

## 2017-12-26 RX ADMIN — SODIUM CHLORIDE 250 ML: 9 INJECTION, SOLUTION INTRAVENOUS at 09:10

## 2017-12-26 RX ADMIN — IRON SUCROSE 200 MG: 20 INJECTION, SOLUTION INTRAVENOUS at 09:10

## 2017-12-27 ENCOUNTER — INFUSION (OUTPATIENT)
Dept: ONCOLOGY | Facility: HOSPITAL | Age: 47
End: 2017-12-27
Attending: INTERNAL MEDICINE

## 2017-12-27 VITALS
WEIGHT: 235 LBS | TEMPERATURE: 97 F | SYSTOLIC BLOOD PRESSURE: 130 MMHG | OXYGEN SATURATION: 98 % | BODY MASS INDEX: 37.77 KG/M2 | RESPIRATION RATE: 18 BRPM | HEART RATE: 56 BPM | HEIGHT: 66 IN | DIASTOLIC BLOOD PRESSURE: 78 MMHG

## 2017-12-27 DIAGNOSIS — D50.9 IRON DEFICIENCY ANEMIA, UNSPECIFIED IRON DEFICIENCY ANEMIA TYPE: Primary | ICD-10-CM

## 2017-12-27 DIAGNOSIS — K90.9 INTESTINAL MALABSORPTION, UNSPECIFIED TYPE: ICD-10-CM

## 2017-12-27 PROCEDURE — 96365 THER/PROPH/DIAG IV INF INIT: CPT

## 2017-12-27 PROCEDURE — 25010000002 IRON SUCROSE PER 1 MG: Performed by: INTERNAL MEDICINE

## 2017-12-27 RX ORDER — FERROUS SULFATE 325(65) MG
325 TABLET ORAL
Qty: 30 TABLET | Refills: 2 | Status: SHIPPED | OUTPATIENT
Start: 2017-12-27 | End: 2021-04-19

## 2017-12-27 RX ORDER — MEPERIDINE HYDROCHLORIDE 25 MG/ML
25 INJECTION INTRAMUSCULAR; INTRAVENOUS; SUBCUTANEOUS
Status: DISCONTINUED | OUTPATIENT
Start: 2017-12-27 | End: 2017-12-27 | Stop reason: HOSPADM

## 2017-12-27 RX ORDER — DIPHENHYDRAMINE HYDROCHLORIDE 50 MG/ML
50 INJECTION INTRAMUSCULAR; INTRAVENOUS AS NEEDED
Status: DISCONTINUED | OUTPATIENT
Start: 2017-12-27 | End: 2017-12-27 | Stop reason: HOSPADM

## 2017-12-27 RX ORDER — FAMOTIDINE 10 MG/ML
20 INJECTION, SOLUTION INTRAVENOUS AS NEEDED
Status: DISCONTINUED | OUTPATIENT
Start: 2017-12-27 | End: 2017-12-27 | Stop reason: HOSPADM

## 2017-12-27 RX ORDER — SODIUM CHLORIDE 9 MG/ML
250 INJECTION, SOLUTION INTRAVENOUS ONCE
Status: COMPLETED | OUTPATIENT
Start: 2017-12-27 | End: 2017-12-27

## 2017-12-27 RX ADMIN — SODIUM CHLORIDE 250 ML: 9 INJECTION, SOLUTION INTRAVENOUS at 08:55

## 2017-12-27 RX ADMIN — IRON SUCROSE 200 MG: 20 INJECTION, SOLUTION INTRAVENOUS at 09:03

## 2018-01-19 ENCOUNTER — TELEPHONE (OUTPATIENT)
Dept: ONCOLOGY | Facility: CLINIC | Age: 48
End: 2018-01-19

## 2018-01-19 NOTE — TELEPHONE ENCOUNTER
Received call from patient, she states due to her insurance she had to order her oral iron tabs and needs directions as how to take.  Returned call back to patient to inform her that she should take Ferrous Sulfate 325 mg tabs, directions one tab po qd x 7 days and then to increase to bid there after. To watch for side effects of GI upset along with constipation and this occurs to call the office for futher instructions. She v/u of our conversation.

## 2018-03-19 DIAGNOSIS — D50.9 NORMOCYTIC HYPOCHROMIC ANEMIA: Primary | ICD-10-CM

## 2018-03-20 ENCOUNTER — APPOINTMENT (OUTPATIENT)
Dept: LAB | Facility: HOSPITAL | Age: 48
End: 2018-03-20
Attending: INTERNAL MEDICINE

## 2020-02-17 ENCOUNTER — OFFICE VISIT (OUTPATIENT)
Dept: ONCOLOGY | Facility: CLINIC | Age: 50
End: 2020-02-17

## 2020-02-17 ENCOUNTER — LAB (OUTPATIENT)
Dept: LAB | Facility: HOSPITAL | Age: 50
End: 2020-02-17
Attending: INTERNAL MEDICINE

## 2020-02-17 VITALS
SYSTOLIC BLOOD PRESSURE: 120 MMHG | HEIGHT: 66 IN | HEART RATE: 85 BPM | DIASTOLIC BLOOD PRESSURE: 70 MMHG | BODY MASS INDEX: 38.73 KG/M2 | OXYGEN SATURATION: 98 % | WEIGHT: 241 LBS | RESPIRATION RATE: 16 BRPM | TEMPERATURE: 97.7 F

## 2020-02-17 DIAGNOSIS — R53.83 FATIGUE, UNSPECIFIED TYPE: ICD-10-CM

## 2020-02-17 DIAGNOSIS — D50.9 NORMOCYTIC HYPOCHROMIC ANEMIA: Primary | ICD-10-CM

## 2020-02-17 PROBLEM — R32 URINARY INCONTINENCE: Status: ACTIVE | Noted: 2017-08-16

## 2020-02-17 PROBLEM — R00.2 PALPITATIONS: Status: ACTIVE | Noted: 2020-02-17

## 2020-02-17 PROBLEM — R31.29 MICROSCOPIC HEMATURIA: Status: ACTIVE | Noted: 2017-08-16

## 2020-02-17 PROBLEM — R39.15 URINARY URGENCY: Status: ACTIVE | Noted: 2017-08-16

## 2020-02-17 PROBLEM — I10 BENIGN ESSENTIAL HYPERTENSION: Status: ACTIVE | Noted: 2020-02-17

## 2020-02-17 PROBLEM — K21.9 GASTROESOPHAGEAL REFLUX DISEASE: Status: ACTIVE | Noted: 2020-02-17

## 2020-02-17 PROBLEM — N28.1 SIMPLE RENAL CYST: Status: ACTIVE | Noted: 2017-09-12

## 2020-02-17 LAB
ALBUMIN SERPL-MCNC: 4.3 G/DL (ref 3.5–5.2)
ALBUMIN/GLOB SERPL: 1.5 G/DL
ALP SERPL-CCNC: 91 U/L (ref 39–117)
ALT SERPL W P-5'-P-CCNC: 33 U/L (ref 1–33)
ANION GAP SERPL CALCULATED.3IONS-SCNC: 12 MMOL/L (ref 5–15)
AST SERPL-CCNC: 29 U/L (ref 1–32)
BASOPHILS # BLD AUTO: 0.04 10*3/MM3 (ref 0–0.2)
BASOPHILS NFR BLD AUTO: 0.6 % (ref 0–1.5)
BILIRUB SERPL-MCNC: 0.4 MG/DL (ref 0.2–1.2)
BUN BLD-MCNC: 11 MG/DL (ref 6–20)
BUN/CREAT SERPL: 23.9 (ref 7–25)
CALCIUM SPEC-SCNC: 8.8 MG/DL (ref 8.6–10.5)
CHLORIDE SERPL-SCNC: 103 MMOL/L (ref 98–107)
CO2 SERPL-SCNC: 27 MMOL/L (ref 22–29)
CREAT BLD-MCNC: 0.46 MG/DL (ref 0.57–1)
DEPRECATED RDW RBC AUTO: 38.6 FL (ref 37–54)
EOSINOPHIL # BLD AUTO: 0.18 10*3/MM3 (ref 0–0.4)
EOSINOPHIL NFR BLD AUTO: 2.7 % (ref 0.3–6.2)
ERYTHROCYTE [DISTWIDTH] IN BLOOD BY AUTOMATED COUNT: 12.5 % (ref 12.3–15.4)
FERRITIN SERPL-MCNC: 217.5 NG/ML (ref 13–150)
GFR SERPL CREATININE-BSD FRML MDRD: 144 ML/MIN/1.73
GLOBULIN UR ELPH-MCNC: 2.9 GM/DL
GLUCOSE BLD-MCNC: 125 MG/DL (ref 65–99)
HCT VFR BLD AUTO: 42.1 % (ref 34–46.6)
HGB BLD-MCNC: 14.3 G/DL (ref 12–15.9)
HOLD SPECIMEN: NORMAL
HOLD SPECIMEN: NORMAL
IMM GRANULOCYTES # BLD AUTO: 0.03 10*3/MM3 (ref 0–0.05)
IMM GRANULOCYTES NFR BLD AUTO: 0.4 % (ref 0–0.5)
IRON 24H UR-MRATE: 48 MCG/DL (ref 37–145)
IRON SATN MFR SERPL: 12 % (ref 20–50)
LYMPHOCYTES # BLD AUTO: 2.89 10*3/MM3 (ref 0.7–3.1)
LYMPHOCYTES NFR BLD AUTO: 43.3 % (ref 19.6–45.3)
MCH RBC QN AUTO: 29.1 PG (ref 26.6–33)
MCHC RBC AUTO-ENTMCNC: 34 G/DL (ref 31.5–35.7)
MCV RBC AUTO: 85.6 FL (ref 79–97)
MONOCYTES # BLD AUTO: 0.51 10*3/MM3 (ref 0.1–0.9)
MONOCYTES NFR BLD AUTO: 7.6 % (ref 5–12)
NEUTROPHILS # BLD AUTO: 3.02 10*3/MM3 (ref 1.7–7)
NEUTROPHILS NFR BLD AUTO: 45.4 % (ref 42.7–76)
NRBC BLD AUTO-RTO: 0 /100 WBC (ref 0–0.2)
PLATELET # BLD AUTO: 324 10*3/MM3 (ref 140–450)
PMV BLD AUTO: 9.9 FL (ref 6–12)
POTASSIUM BLD-SCNC: 3.4 MMOL/L (ref 3.5–5.2)
PROT SERPL-MCNC: 7.2 G/DL (ref 6–8.5)
RBC # BLD AUTO: 4.92 10*6/MM3 (ref 3.77–5.28)
SODIUM BLD-SCNC: 142 MMOL/L (ref 136–145)
T4 FREE SERPL-MCNC: 1.16 NG/DL (ref 0.93–1.7)
TIBC SERPL-MCNC: 390 MCG/DL (ref 298–536)
TRANSFERRIN SERPL-MCNC: 262 MG/DL (ref 200–360)
TSH SERPL DL<=0.05 MIU/L-ACNC: 1.85 UIU/ML (ref 0.27–4.2)
WBC NRBC COR # BLD: 6.67 10*3/MM3 (ref 3.4–10.8)

## 2020-02-17 PROCEDURE — 80053 COMPREHEN METABOLIC PANEL: CPT | Performed by: INTERNAL MEDICINE

## 2020-02-17 PROCEDURE — 84466 ASSAY OF TRANSFERRIN: CPT | Performed by: INTERNAL MEDICINE

## 2020-02-17 PROCEDURE — 82728 ASSAY OF FERRITIN: CPT | Performed by: INTERNAL MEDICINE

## 2020-02-17 PROCEDURE — 84443 ASSAY THYROID STIM HORMONE: CPT | Performed by: INTERNAL MEDICINE

## 2020-02-17 PROCEDURE — 36415 COLL VENOUS BLD VENIPUNCTURE: CPT | Performed by: INTERNAL MEDICINE

## 2020-02-17 PROCEDURE — 84439 ASSAY OF FREE THYROXINE: CPT | Performed by: INTERNAL MEDICINE

## 2020-02-17 PROCEDURE — 83540 ASSAY OF IRON: CPT | Performed by: INTERNAL MEDICINE

## 2020-02-17 PROCEDURE — 84481 FREE ASSAY (FT-3): CPT

## 2020-02-17 PROCEDURE — 99215 OFFICE O/P EST HI 40 MIN: CPT | Performed by: INTERNAL MEDICINE

## 2020-02-17 PROCEDURE — 85025 COMPLETE CBC W/AUTO DIFF WBC: CPT | Performed by: INTERNAL MEDICINE

## 2020-02-17 RX ORDER — BETHANECHOL CHLORIDE 25 MG/1
25 TABLET ORAL 3 TIMES DAILY
COMMUNITY
End: 2021-04-19 | Stop reason: ALTCHOICE

## 2020-02-17 RX ORDER — RANITIDINE 300 MG/1
300 CAPSULE ORAL
COMMUNITY
Start: 2019-12-30 | End: 2020-05-19

## 2020-02-17 NOTE — PROGRESS NOTES
[facility]  HEMATOLOGY & ONCOLOGY        Subjective     VISIT DIAGNOSIS:   Encounter Diagnoses   Name Primary?   • Normocytic hypochromic anemia Yes   • Fatigue, unspecified type        REASON FOR VISIT:     Chief Complaint   Patient presents with   • iron deficiency     here for 3 month follow up, fatigued more then normal        HEMATOLOGY / ONCOLOGY HISTORY:   Oncology/Hematology History    46-year-old patient history of hematuria, she was referred to urology for workup for hematuria.  Last showed PFA abnormal with collagen epinephrine binding greater than 300.  Late left 272, ProTime 12.5 normal INR of 0.9..  She denies nosebleeds, or hemoptysis.  She denies family history of bleeding.  She had a hysterectomy due to bleeding fibroids.  She denies  easy bruising.  No lymphadenopathy.       [No treatment plan]  Cancer Staging Information:  Cancer Staging  No matching staging information was found for the patient.      INTERVAL HISTORY  Patient ID: Kathy Meadows is a 49 y.o. year old female  With no cancer hx, whom am seing for ROSALIND. Pt lost to f/u for 2 years. Presents today due to feeling fatigued more than ususal. Does not feel refreshed even though sleeps more than 8 hours. She is not sure if she snores. Denies sob, cp, n/v. PE non focal.      Review of Systems   Constitutional: Positive for activity change and fatigue. Negative for appetite change and unexpected weight change.   HENT: Negative.    Eyes: Negative.    Respiratory: Negative.    Cardiovascular: Negative.    Gastrointestinal: Negative.    Endocrine: Negative.    Genitourinary: Negative.    Musculoskeletal: Negative.    Skin: Negative.    Neurological: Negative.    Hematological: Negative.    Psychiatric/Behavioral: Negative.             Medications:    Current Outpatient Medications   Medication Sig Dispense Refill   • B Complex-C-E-Zn (B COMPLEX-C-E-ZINC) tablet Take 1 tablet by mouth Daily.     • bethanechol (URECHOLINE) 25 MG  tablet Take 25 mg by mouth 3 (Three) Times a Day.     • Biotin 1000 MCG tablet Take 1,000 mcg by mouth 3 (Three) Times a Day.     • famotidine (PEPCID) 40 MG tablet TK 1 T PO D HS  2   • ferrous sulfate 325 (65 FE) MG tablet Take 1 tablet by mouth Daily With Breakfast. 30 tablet 2   • magnesium oxide (MAGOX) 400 (241.3 Mg) MG tablet tablet Take 400 mg by mouth Daily.     • metoprolol succinate XL (TOPROL-XL) 25 MG 24 hr tablet TK 1 T PO ONCE A DAY. FOR HIGH BP  2   • omeprazole (priLOSEC) 40 MG capsule TK ONE C PO BID  2   • raNITIdine (ZANTAC) 300 MG capsule Take 300 mg by mouth.     • traZODone (DESYREL) 100 MG tablet TK 1 T PO HS. TO IMPROVE QUALITY OF SLEEP  2   • vitamin D (ERGOCALCIFEROL) 30683 units capsule capsule TK ONE C PO ONCE A WEEK  2     No current facility-administered medications for this visit.        ALLERGIES:    Allergies   Allergen Reactions   • Amoxicillin Hives       Objective      Vitals:    02/17/20 1501   BP: 120/70   Pulse: 85   Resp: 16   Temp: 97.7 °F (36.5 °C)   SpO2: 98%       Current Status 2/17/2020   ECOG score 0       General Appearance: Patient is awake, alert, oriented and in no acute distress. Patient is welldeveloped, wellnourished, and appears stated age.  HEENT: Normocephalic. Sclerae clear, conjunctiva pink, extraocular movements intact, pupils, round, reactive to light and  accommodation. Mouth and throat are clear with moist oral mucosa.  NECK: Supple, no jugular venous distention, thyroid not enlarged.  LYMPH: No cervical, supraclavicular, axillary, or inguinal lymphadenopathy.  CHEST: Equal bilateral expansion, AP  diameter normal, resonant percussion note  LUNGS: Good air movement, no rales, rhonchi, rubs or wheezes with auscultation  CARDIO: Regular sinus rhythm, no murmurs, gallops or rubs.  ABDOMEN: Nondistended, soft, No tenderness, no guarding, no rebound, No hepatosplenomegaly. No abdominal masses. Bowel sounds positive. No hernia  GENITALIA: Not  examined.  BREASTS: Not examined.  MUSKEL: No joint swelling, decreased motion, or inflammation  EXTREMS: No edema, clubbing, cyanosis, No varicose veins.  NEURO: Grossly nonfocal, Gait is coordinated and smooth, Cognition is preserved.  SKIN: No rashes, no ecchymoses, no petechia.  PSYCH: Oriented to time, place and person. Memory is preserved. Mood and affect appear normal      RECENT LABS:  Office Visit on 02/17/2020   Component Date Value Ref Range Status   • Glucose 02/17/2020 125* 65 - 99 mg/dL Final   • BUN 02/17/2020 11  6 - 20 mg/dL Final   • Creatinine 02/17/2020 0.46* 0.57 - 1.00 mg/dL Final   • Sodium 02/17/2020 142  136 - 145 mmol/L Final   • Potassium 02/17/2020 3.4* 3.5 - 5.2 mmol/L Final   • Chloride 02/17/2020 103  98 - 107 mmol/L Final   • CO2 02/17/2020 27.0  22.0 - 29.0 mmol/L Final   • Calcium 02/17/2020 8.8  8.6 - 10.5 mg/dL Final   • Total Protein 02/17/2020 7.2  6.0 - 8.5 g/dL Final   • Albumin 02/17/2020 4.30  3.50 - 5.20 g/dL Final   • ALT (SGPT) 02/17/2020 33  1 - 33 U/L Final   • AST (SGOT) 02/17/2020 29  1 - 32 U/L Final    Specimen hemolyzed.  Results may be affected.   • Alkaline Phosphatase 02/17/2020 91  39 - 117 U/L Final   • Total Bilirubin 02/17/2020 0.4  0.2 - 1.2 mg/dL Final   • eGFR Non African Amer 02/17/2020 144  >60 mL/min/1.73 Final   • Globulin 02/17/2020 2.9  gm/dL Final   • A/G Ratio 02/17/2020 1.5  g/dL Final   • BUN/Creatinine Ratio 02/17/2020 23.9  7.0 - 25.0 Final   • Anion Gap 02/17/2020 12.0  5.0 - 15.0 mmol/L Final   • WBC 02/17/2020 6.67  3.40 - 10.80 10*3/mm3 Final   • RBC 02/17/2020 4.92  3.77 - 5.28 10*6/mm3 Final   • Hemoglobin 02/17/2020 14.3  12.0 - 15.9 g/dL Final   • Hematocrit 02/17/2020 42.1  34.0 - 46.6 % Final   • MCV 02/17/2020 85.6  79.0 - 97.0 fL Final   • MCH 02/17/2020 29.1  26.6 - 33.0 pg Final   • MCHC 02/17/2020 34.0  31.5 - 35.7 g/dL Final   • RDW 02/17/2020 12.5  12.3 - 15.4 % Final   • RDW-SD 02/17/2020 38.6  37.0 - 54.0 fl Final   • MPV  02/17/2020 9.9  6.0 - 12.0 fL Final   • Platelets 02/17/2020 324  140 - 450 10*3/mm3 Final   • Neutrophil % 02/17/2020 45.4  42.7 - 76.0 % Final   • Lymphocyte % 02/17/2020 43.3  19.6 - 45.3 % Final   • Monocyte % 02/17/2020 7.6  5.0 - 12.0 % Final   • Eosinophil % 02/17/2020 2.7  0.3 - 6.2 % Final   • Basophil % 02/17/2020 0.6  0.0 - 1.5 % Final   • Immature Grans % 02/17/2020 0.4  0.0 - 0.5 % Final   • Neutrophils, Absolute 02/17/2020 3.02  1.70 - 7.00 10*3/mm3 Final   • Lymphocytes, Absolute 02/17/2020 2.89  0.70 - 3.10 10*3/mm3 Final   • Monocytes, Absolute 02/17/2020 0.51  0.10 - 0.90 10*3/mm3 Final   • Eosinophils, Absolute 02/17/2020 0.18  0.00 - 0.40 10*3/mm3 Final   • Basophils, Absolute 02/17/2020 0.04  0.00 - 0.20 10*3/mm3 Final   • Immature Grans, Absolute 02/17/2020 0.03  0.00 - 0.05 10*3/mm3 Final   • nRBC 02/17/2020 0.0  0.0 - 0.2 /100 WBC Final   • Iron 02/17/2020 48  37 - 145 mcg/dL Final   • Iron Saturation 02/17/2020 12* 20 - 50 % Final   • Transferrin 02/17/2020 262  200 - 360 mg/dL Final   • TIBC 02/17/2020 390  298 - 536 mcg/dL Final   • Ferritin 02/17/2020 217.50* 13.00 - 150.00 ng/mL Final   • TSH 02/17/2020 1.850  0.270 - 4.200 uIU/mL Final   • Free T4 02/17/2020 1.16  0.93 - 1.70 ng/dL Final   Lab on 02/17/2020   Component Date Value Ref Range Status   • Extra Tube 02/17/2020 Hold for add-ons.   Final    Auto resulted.   • Extra Tube 02/17/2020 Hold for add-ons.   Final    Auto resulted.   • T3, Free 02/17/2020 3.66  2.00 - 4.40 pg/mL Final       RADIOLOGY:  No results found.         Assessment/Plan  Kathy Meadows is a 47 y.o. year old female referred due to abnormal PFA studies and workup revealed severe iron deficiency anemia.    1. Anemia 2/2 iron def: on ferrous sulfate   -wbc 6.67, Hg 14.3, plt 324  -check iron profile, ferritin     2. Fatigue: pt reports sleeping very well but still wakes up exhausted. Will check TSH, fT4, fT3   -refer for sleep study.      Time Spent: 60 minutes;  greater than  50% of time was spent in patient counseling and care coordination.     Milvia Bhatti MD    2/17/2020    11:10 AM

## 2020-02-18 ENCOUNTER — TELEPHONE (OUTPATIENT)
Dept: ONCOLOGY | Facility: CLINIC | Age: 50
End: 2020-02-18

## 2020-02-18 DIAGNOSIS — D50.9 IRON DEFICIENCY ANEMIA, UNSPECIFIED IRON DEFICIENCY ANEMIA TYPE: ICD-10-CM

## 2020-02-18 DIAGNOSIS — K90.9 INTESTINAL MALABSORPTION, UNSPECIFIED TYPE: ICD-10-CM

## 2020-02-18 DIAGNOSIS — R53.83 FATIGUE, UNSPECIFIED TYPE: Primary | ICD-10-CM

## 2020-02-18 LAB — T3FREE SERPL-MCNC: 3.66 PG/ML (ref 2–4.4)

## 2020-02-18 RX ORDER — SODIUM CHLORIDE 9 MG/ML
250 INJECTION, SOLUTION INTRAVENOUS ONCE
Status: CANCELLED | OUTPATIENT
Start: 2020-02-27

## 2020-02-18 RX ORDER — SODIUM CHLORIDE 9 MG/ML
250 INJECTION, SOLUTION INTRAVENOUS ONCE
Status: CANCELLED | OUTPATIENT
Start: 2020-02-21

## 2020-02-18 RX ORDER — FAMOTIDINE 10 MG/ML
20 INJECTION, SOLUTION INTRAVENOUS AS NEEDED
Status: CANCELLED | OUTPATIENT
Start: 2020-02-27

## 2020-02-18 RX ORDER — DIPHENHYDRAMINE HYDROCHLORIDE 50 MG/ML
50 INJECTION INTRAMUSCULAR; INTRAVENOUS AS NEEDED
Status: CANCELLED | OUTPATIENT
Start: 2020-02-27

## 2020-02-18 RX ORDER — FAMOTIDINE 10 MG/ML
20 INJECTION, SOLUTION INTRAVENOUS AS NEEDED
Status: CANCELLED | OUTPATIENT
Start: 2020-02-21

## 2020-02-18 RX ORDER — DIPHENHYDRAMINE HYDROCHLORIDE 50 MG/ML
50 INJECTION INTRAMUSCULAR; INTRAVENOUS AS NEEDED
Status: CANCELLED | OUTPATIENT
Start: 2020-02-21

## 2020-02-18 NOTE — TELEPHONE ENCOUNTER
Return call from Kathy.  Scheduled Injectafer on 2/21/2020 at 1:30 and and 2/27/2020 at 8:30 per patient request.

## 2020-02-18 NOTE — TELEPHONE ENCOUNTER
----- Message from Milvia Bhatti MD sent at 2/18/2020  8:46 AM CST -----  Please call the patient regarding her abnormal result. plz schedule injectafer x2

## 2020-02-21 ENCOUNTER — INFUSION (OUTPATIENT)
Dept: ONCOLOGY | Facility: HOSPITAL | Age: 50
End: 2020-02-21
Attending: INTERNAL MEDICINE

## 2020-02-21 VITALS
DIASTOLIC BLOOD PRESSURE: 68 MMHG | TEMPERATURE: 98.1 F | RESPIRATION RATE: 18 BRPM | HEART RATE: 73 BPM | OXYGEN SATURATION: 100 % | SYSTOLIC BLOOD PRESSURE: 136 MMHG | BODY MASS INDEX: 39.37 KG/M2 | WEIGHT: 245 LBS | HEIGHT: 66 IN

## 2020-02-21 DIAGNOSIS — D50.9 IRON DEFICIENCY ANEMIA, UNSPECIFIED IRON DEFICIENCY ANEMIA TYPE: Primary | ICD-10-CM

## 2020-02-21 DIAGNOSIS — K90.9 INTESTINAL MALABSORPTION, UNSPECIFIED TYPE: ICD-10-CM

## 2020-02-21 PROCEDURE — 96365 THER/PROPH/DIAG IV INF INIT: CPT

## 2020-02-21 PROCEDURE — 25010000002 FERRIC CARBOXYMALTOSE 750 MG/15ML SOLUTION 15 ML VIAL: Performed by: INTERNAL MEDICINE

## 2020-02-21 RX ORDER — DIPHENHYDRAMINE HYDROCHLORIDE 50 MG/ML
50 INJECTION INTRAMUSCULAR; INTRAVENOUS AS NEEDED
Status: DISCONTINUED | OUTPATIENT
Start: 2020-02-21 | End: 2020-02-21 | Stop reason: HOSPADM

## 2020-02-21 RX ORDER — FAMOTIDINE 10 MG/ML
20 INJECTION, SOLUTION INTRAVENOUS AS NEEDED
Status: DISCONTINUED | OUTPATIENT
Start: 2020-02-21 | End: 2020-02-21 | Stop reason: HOSPADM

## 2020-02-21 RX ORDER — SODIUM CHLORIDE 9 MG/ML
250 INJECTION, SOLUTION INTRAVENOUS ONCE
Status: COMPLETED | OUTPATIENT
Start: 2020-02-21 | End: 2020-02-21

## 2020-02-21 RX ADMIN — SODIUM CHLORIDE 250 ML: 9 INJECTION, SOLUTION INTRAVENOUS at 14:25

## 2020-02-21 RX ADMIN — FERRIC CARBOXYMALTOSE INJECTION 750 MG: 50 INJECTION, SOLUTION INTRAVENOUS at 14:25

## 2020-02-27 ENCOUNTER — INFUSION (OUTPATIENT)
Dept: ONCOLOGY | Facility: HOSPITAL | Age: 50
End: 2020-02-27
Attending: INTERNAL MEDICINE

## 2020-02-27 VITALS
DIASTOLIC BLOOD PRESSURE: 82 MMHG | HEART RATE: 75 BPM | BODY MASS INDEX: 40.82 KG/M2 | TEMPERATURE: 97.1 F | OXYGEN SATURATION: 100 % | HEIGHT: 65 IN | WEIGHT: 245 LBS | RESPIRATION RATE: 18 BRPM | SYSTOLIC BLOOD PRESSURE: 162 MMHG

## 2020-02-27 DIAGNOSIS — D50.9 IRON DEFICIENCY ANEMIA, UNSPECIFIED IRON DEFICIENCY ANEMIA TYPE: ICD-10-CM

## 2020-02-27 DIAGNOSIS — D50.9 IRON DEFICIENCY ANEMIA, UNSPECIFIED IRON DEFICIENCY ANEMIA TYPE: Primary | ICD-10-CM

## 2020-02-27 DIAGNOSIS — K90.9 INTESTINAL MALABSORPTION, UNSPECIFIED TYPE: Primary | ICD-10-CM

## 2020-02-27 PROCEDURE — 96365 THER/PROPH/DIAG IV INF INIT: CPT

## 2020-02-27 PROCEDURE — 25010000002 FERRIC CARBOXYMALTOSE 750 MG/15ML SOLUTION 15 ML VIAL: Performed by: INTERNAL MEDICINE

## 2020-02-27 RX ORDER — FAMOTIDINE 10 MG/ML
20 INJECTION, SOLUTION INTRAVENOUS AS NEEDED
Status: DISCONTINUED | OUTPATIENT
Start: 2020-02-27 | End: 2020-02-27 | Stop reason: HOSPADM

## 2020-02-27 RX ORDER — DIPHENHYDRAMINE HYDROCHLORIDE 50 MG/ML
50 INJECTION INTRAMUSCULAR; INTRAVENOUS AS NEEDED
Status: DISCONTINUED | OUTPATIENT
Start: 2020-02-27 | End: 2020-02-27 | Stop reason: HOSPADM

## 2020-02-27 RX ORDER — SODIUM CHLORIDE 9 MG/ML
250 INJECTION, SOLUTION INTRAVENOUS ONCE
Status: COMPLETED | OUTPATIENT
Start: 2020-02-27 | End: 2020-02-27

## 2020-02-27 RX ADMIN — SODIUM CHLORIDE 250 ML: 9 INJECTION, SOLUTION INTRAVENOUS at 09:47

## 2020-02-27 RX ADMIN — FERRIC CARBOXYMALTOSE INJECTION 750 MG: 50 INJECTION, SOLUTION INTRAVENOUS at 09:49

## 2020-03-06 ENCOUNTER — HOSPITAL ENCOUNTER (OUTPATIENT)
Dept: SLEEP MEDICINE | Facility: HOSPITAL | Age: 50
End: 2020-03-06

## 2020-03-09 ENCOUNTER — HOSPITAL ENCOUNTER (OUTPATIENT)
Dept: SLEEP MEDICINE | Facility: HOSPITAL | Age: 50
Discharge: HOME | End: 2020-03-09

## 2020-04-13 ENCOUNTER — LAB (OUTPATIENT)
Dept: LAB | Facility: HOSPITAL | Age: 50
End: 2020-04-13

## 2020-04-13 DIAGNOSIS — D50.9 IRON DEFICIENCY ANEMIA, UNSPECIFIED IRON DEFICIENCY ANEMIA TYPE: ICD-10-CM

## 2020-04-13 LAB
ALBUMIN SERPL-MCNC: 4.3 G/DL (ref 3.5–5.2)
ALBUMIN/GLOB SERPL: 1.5 G/DL
ALP SERPL-CCNC: 89 U/L (ref 39–117)
ALT SERPL W P-5'-P-CCNC: 43 U/L (ref 1–33)
ANION GAP SERPL CALCULATED.3IONS-SCNC: 12 MMOL/L (ref 5–15)
AST SERPL-CCNC: 29 U/L (ref 1–32)
BASOPHILS # BLD AUTO: 0.04 10*3/MM3 (ref 0–0.2)
BASOPHILS NFR BLD AUTO: 0.8 % (ref 0–1.5)
BILIRUB SERPL-MCNC: 0.6 MG/DL (ref 0.2–1.2)
BUN BLD-MCNC: 12 MG/DL (ref 6–20)
BUN/CREAT SERPL: 23.5 (ref 7–25)
CALCIUM SPEC-SCNC: 9.1 MG/DL (ref 8.6–10.5)
CHLORIDE SERPL-SCNC: 103 MMOL/L (ref 98–107)
CO2 SERPL-SCNC: 27 MMOL/L (ref 22–29)
CREAT BLD-MCNC: 0.51 MG/DL (ref 0.57–1)
DEPRECATED RDW RBC AUTO: 40.5 FL (ref 37–54)
EOSINOPHIL # BLD AUTO: 0.14 10*3/MM3 (ref 0–0.4)
EOSINOPHIL NFR BLD AUTO: 2.7 % (ref 0.3–6.2)
ERYTHROCYTE [DISTWIDTH] IN BLOOD BY AUTOMATED COUNT: 12.8 % (ref 12.3–15.4)
FERRITIN SERPL-MCNC: 792.3 NG/ML (ref 13–150)
GFR SERPL CREATININE-BSD FRML MDRD: 128 ML/MIN/1.73
GLOBULIN UR ELPH-MCNC: 2.8 GM/DL
GLUCOSE BLD-MCNC: 122 MG/DL (ref 65–99)
HCT VFR BLD AUTO: 43.6 % (ref 34–46.6)
HGB BLD-MCNC: 14.8 G/DL (ref 12–15.9)
IMM GRANULOCYTES # BLD AUTO: 0.03 10*3/MM3 (ref 0–0.05)
IMM GRANULOCYTES NFR BLD AUTO: 0.6 % (ref 0–0.5)
IRON 24H UR-MRATE: 80 MCG/DL (ref 37–145)
IRON SATN MFR SERPL: 24 % (ref 20–50)
LYMPHOCYTES # BLD AUTO: 2.32 10*3/MM3 (ref 0.7–3.1)
LYMPHOCYTES NFR BLD AUTO: 44.9 % (ref 19.6–45.3)
MCH RBC QN AUTO: 29.6 PG (ref 26.6–33)
MCHC RBC AUTO-ENTMCNC: 33.9 G/DL (ref 31.5–35.7)
MCV RBC AUTO: 87.2 FL (ref 79–97)
MONOCYTES # BLD AUTO: 0.49 10*3/MM3 (ref 0.1–0.9)
MONOCYTES NFR BLD AUTO: 9.5 % (ref 5–12)
NEUTROPHILS # BLD AUTO: 2.15 10*3/MM3 (ref 1.7–7)
NEUTROPHILS NFR BLD AUTO: 41.5 % (ref 42.7–76)
NRBC BLD AUTO-RTO: 0 /100 WBC (ref 0–0.2)
PLATELET # BLD AUTO: 264 10*3/MM3 (ref 140–450)
PMV BLD AUTO: 10.1 FL (ref 6–12)
POTASSIUM BLD-SCNC: 3.6 MMOL/L (ref 3.5–5.2)
PROT SERPL-MCNC: 7.1 G/DL (ref 6–8.5)
RBC # BLD AUTO: 5 10*6/MM3 (ref 3.77–5.28)
SODIUM BLD-SCNC: 142 MMOL/L (ref 136–145)
TIBC SERPL-MCNC: 337 MCG/DL (ref 298–536)
TRANSFERRIN SERPL-MCNC: 226 MG/DL (ref 200–360)
WBC NRBC COR # BLD: 5.17 10*3/MM3 (ref 3.4–10.8)

## 2020-04-13 PROCEDURE — 83540 ASSAY OF IRON: CPT

## 2020-04-13 PROCEDURE — 36415 COLL VENOUS BLD VENIPUNCTURE: CPT

## 2020-04-13 PROCEDURE — 85025 COMPLETE CBC W/AUTO DIFF WBC: CPT

## 2020-04-13 PROCEDURE — 80053 COMPREHEN METABOLIC PANEL: CPT

## 2020-04-13 PROCEDURE — 82728 ASSAY OF FERRITIN: CPT

## 2020-04-13 PROCEDURE — 84466 ASSAY OF TRANSFERRIN: CPT

## 2020-05-19 ENCOUNTER — LAB (OUTPATIENT)
Dept: LAB | Facility: HOSPITAL | Age: 50
End: 2020-05-19

## 2020-05-19 ENCOUNTER — TELEPHONE (OUTPATIENT)
Dept: ONCOLOGY | Facility: CLINIC | Age: 50
End: 2020-05-19

## 2020-05-19 ENCOUNTER — OFFICE VISIT (OUTPATIENT)
Dept: ONCOLOGY | Facility: CLINIC | Age: 50
End: 2020-05-19

## 2020-05-19 VITALS
RESPIRATION RATE: 16 BRPM | HEIGHT: 65 IN | BODY MASS INDEX: 41.8 KG/M2 | SYSTOLIC BLOOD PRESSURE: 138 MMHG | DIASTOLIC BLOOD PRESSURE: 88 MMHG | OXYGEN SATURATION: 99 % | WEIGHT: 250.9 LBS | HEART RATE: 76 BPM | TEMPERATURE: 97.3 F

## 2020-05-19 DIAGNOSIS — D50.9 IRON DEFICIENCY ANEMIA, UNSPECIFIED IRON DEFICIENCY ANEMIA TYPE: Primary | ICD-10-CM

## 2020-05-19 DIAGNOSIS — D50.9 IRON DEFICIENCY ANEMIA, UNSPECIFIED IRON DEFICIENCY ANEMIA TYPE: Primary | Chronic | ICD-10-CM

## 2020-05-19 LAB
ALBUMIN SERPL-MCNC: 4.2 G/DL (ref 3.5–5.2)
ALBUMIN/GLOB SERPL: 1.6 G/DL
ALP SERPL-CCNC: 87 U/L (ref 39–117)
ALT SERPL W P-5'-P-CCNC: 45 U/L (ref 1–33)
ANION GAP SERPL CALCULATED.3IONS-SCNC: 11 MMOL/L (ref 5–15)
AST SERPL-CCNC: 31 U/L (ref 1–32)
BASOPHILS # BLD AUTO: 0.04 10*3/MM3 (ref 0–0.2)
BASOPHILS NFR BLD AUTO: 0.7 % (ref 0–1.5)
BILIRUB SERPL-MCNC: 0.5 MG/DL (ref 0.2–1.2)
BUN BLD-MCNC: 12 MG/DL (ref 6–20)
BUN/CREAT SERPL: 27.9 (ref 7–25)
CALCIUM SPEC-SCNC: 9.1 MG/DL (ref 8.6–10.5)
CHLORIDE SERPL-SCNC: 105 MMOL/L (ref 98–107)
CO2 SERPL-SCNC: 28 MMOL/L (ref 22–29)
CREAT BLD-MCNC: 0.43 MG/DL (ref 0.57–1)
DEPRECATED RDW RBC AUTO: 40.3 FL (ref 37–54)
EOSINOPHIL # BLD AUTO: 0.13 10*3/MM3 (ref 0–0.4)
EOSINOPHIL NFR BLD AUTO: 2.2 % (ref 0.3–6.2)
ERYTHROCYTE [DISTWIDTH] IN BLOOD BY AUTOMATED COUNT: 12.8 % (ref 12.3–15.4)
FERRITIN SERPL-MCNC: 657.9 NG/ML (ref 13–150)
GFR SERPL CREATININE-BSD FRML MDRD: >150 ML/MIN/1.73
GLOBULIN UR ELPH-MCNC: 2.6 GM/DL
GLUCOSE BLD-MCNC: 137 MG/DL (ref 65–99)
HCT VFR BLD AUTO: 39.8 % (ref 34–46.6)
HGB BLD-MCNC: 13.9 G/DL (ref 12–15.9)
HOLD SPECIMEN: NORMAL
HOLD SPECIMEN: NORMAL
IMM GRANULOCYTES # BLD AUTO: 0.04 10*3/MM3 (ref 0–0.05)
IMM GRANULOCYTES NFR BLD AUTO: 0.7 % (ref 0–0.5)
IRON 24H UR-MRATE: 63 MCG/DL (ref 37–145)
IRON SATN MFR SERPL: 19 % (ref 20–50)
LYMPHOCYTES # BLD AUTO: 2.77 10*3/MM3 (ref 0.7–3.1)
LYMPHOCYTES NFR BLD AUTO: 46.9 % (ref 19.6–45.3)
MCH RBC QN AUTO: 30.3 PG (ref 26.6–33)
MCHC RBC AUTO-ENTMCNC: 34.9 G/DL (ref 31.5–35.7)
MCV RBC AUTO: 86.7 FL (ref 79–97)
MONOCYTES # BLD AUTO: 0.45 10*3/MM3 (ref 0.1–0.9)
MONOCYTES NFR BLD AUTO: 7.6 % (ref 5–12)
NEUTROPHILS # BLD AUTO: 2.48 10*3/MM3 (ref 1.7–7)
NEUTROPHILS NFR BLD AUTO: 41.9 % (ref 42.7–76)
NRBC BLD AUTO-RTO: 0 /100 WBC (ref 0–0.2)
PLATELET # BLD AUTO: 251 10*3/MM3 (ref 140–450)
PMV BLD AUTO: 10.1 FL (ref 6–12)
POTASSIUM BLD-SCNC: 3.4 MMOL/L (ref 3.5–5.2)
PROT SERPL-MCNC: 6.8 G/DL (ref 6–8.5)
RBC # BLD AUTO: 4.59 10*6/MM3 (ref 3.77–5.28)
SODIUM BLD-SCNC: 144 MMOL/L (ref 136–145)
TIBC SERPL-MCNC: 337 MCG/DL (ref 298–536)
TRANSFERRIN SERPL-MCNC: 226 MG/DL (ref 200–360)
WBC NRBC COR # BLD: 5.91 10*3/MM3 (ref 3.4–10.8)

## 2020-05-19 PROCEDURE — 83540 ASSAY OF IRON: CPT

## 2020-05-19 PROCEDURE — 84466 ASSAY OF TRANSFERRIN: CPT

## 2020-05-19 PROCEDURE — 99214 OFFICE O/P EST MOD 30 MIN: CPT | Performed by: INTERNAL MEDICINE

## 2020-05-19 PROCEDURE — 85025 COMPLETE CBC W/AUTO DIFF WBC: CPT

## 2020-05-19 PROCEDURE — 80053 COMPREHEN METABOLIC PANEL: CPT

## 2020-05-19 PROCEDURE — 82728 ASSAY OF FERRITIN: CPT

## 2020-05-19 PROCEDURE — 36415 COLL VENOUS BLD VENIPUNCTURE: CPT

## 2020-05-19 NOTE — TELEPHONE ENCOUNTER
Notified pt she v/u      ----- Message from Milvia Bhatti MD sent at 5/19/2020  3:34 PM CDT -----  Please call the patient regarding her abnormal result. Low potassium. Advised to eat potassium rich food such as banana.[email or web address].  Ferritin in the right direction, lower than before

## 2020-05-19 NOTE — PROGRESS NOTES
[facility]  HEMATOLOGY & ONCOLOGY        Subjective     VISIT DIAGNOSIS:   No diagnosis found.    REASON FOR VISIT:     Chief Complaint   Patient presents with   • ROSALIND     Here for f/u        HEMATOLOGY / ONCOLOGY HISTORY:   Oncology/Hematology History    46-year-old patient history of hematuria, she was referred to urology for workup for hematuria.  Last showed PFA abnormal with collagen epinephrine binding greater than 300.  Late left 272, ProTime 12.5 normal INR of 0.9..  She denies nosebleeds, or hemoptysis.  She denies family history of bleeding.  She had a hysterectomy due to bleeding fibroids.  She denies  easy bruising.  No lymphadenopathy.       [No treatment plan]  Cancer Staging Information:  Cancer Staging  No matching staging information was found for the patient.      INTERVAL HISTORY  Patient ID: Kathy Meadows is a 49 y.o. year old female  With no cancer hx, whom am seing for ROSALIND.  Presents today for f/u of ROSALIND, s/p injectafer. No new issues. Woried about elevated ferritin after injectafer.  SHe had c-scope in 2019, per her report unremarkable, we will get records, and has egd scheduled for June 2020.  - Denies sob, cp, n/v, abdominal pain, dizziness, MANUEL, unintentional weight loss, night sweats. ROS unremarkable. PE non focal.      Review of Systems   Constitutional: Positive for activity change and fatigue. Negative for appetite change and unexpected weight change.   HENT: Negative.    Eyes: Negative.    Respiratory: Negative.    Cardiovascular: Negative.    Gastrointestinal: Negative.    Endocrine: Negative.    Genitourinary: Negative.    Musculoskeletal: Negative.    Skin: Negative.    Neurological: Negative.    Hematological: Negative.    Psychiatric/Behavioral: Negative.             Medications:    Current Outpatient Medications   Medication Sig Dispense Refill   • B Complex-C-E-Zn (B COMPLEX-C-E-ZINC) tablet Take 1 tablet by mouth Daily.     • bethanechol (URECHOLINE) 25 MG  tablet Take 25 mg by mouth 3 (Three) Times a Day.     • ferrous sulfate 325 (65 FE) MG tablet Take 1 tablet by mouth Daily With Breakfast. 30 tablet 2   • magnesium oxide (MAGOX) 400 (241.3 Mg) MG tablet tablet Take 400 mg by mouth Daily.     • metoprolol succinate XL (TOPROL-XL) 25 MG 24 hr tablet TK 1 T PO ONCE A DAY. FOR HIGH BP  2   • omeprazole (priLOSEC) 40 MG capsule TK ONE C PO BID  2   • traZODone (DESYREL) 100 MG tablet TK 1 T PO HS. TO IMPROVE QUALITY OF SLEEP  2   • vitamin D (ERGOCALCIFEROL) 64511 units capsule capsule TK ONE C PO ONCE A WEEK  2     No current facility-administered medications for this visit.        ALLERGIES:    Allergies   Allergen Reactions   • Amoxicillin Hives       Objective      Vitals:    05/19/20 1443   BP: 138/88   Pulse: 76   Resp: 16   Temp: 97.3 °F (36.3 °C)   SpO2: 99%       Current Status 5/19/2020   ECOG score 0       General Appearance: Patient is awake, alert, oriented and in no acute distress. Patient is welldeveloped, wellnourished, and appears stated age.  HEENT: Normocephalic. Sclerae clear, conjunctiva pink, extraocular movements intact, pupils, round, reactive to light and  accommodation. Mouth and throat are clear with moist oral mucosa.  NECK: Supple, no jugular venous distention, thyroid not enlarged.  LYMPH: No cervical, supraclavicular, axillary, or inguinal lymphadenopathy.  CHEST: Equal bilateral expansion, AP  diameter normal, resonant percussion note  LUNGS: Good air movement, no rales, rhonchi, rubs or wheezes with auscultation  CARDIO: Regular sinus rhythm, no murmurs, gallops or rubs.  ABDOMEN: Nondistended, soft, No tenderness, no guarding, no rebound, No hepatosplenomegaly. No abdominal masses. Bowel sounds positive. No hernia  GENITALIA: Not examined.  BREASTS: Not examined.  MUSKEL: No joint swelling, decreased motion, or inflammation  EXTREMS: No edema, clubbing, cyanosis, No varicose veins.  NEURO: Grossly nonfocal, Gait is coordinated and  smooth, Cognition is preserved.  SKIN: No rashes, no ecchymoses, no petechia.  PSYCH: Oriented to time, place and person. Memory is preserved. Mood and affect appear normal      RECENT LABS:  Lab on 05/19/2020   Component Date Value Ref Range Status   • WBC 05/19/2020 5.91  3.40 - 10.80 10*3/mm3 Final   • RBC 05/19/2020 4.59  3.77 - 5.28 10*6/mm3 Final   • Hemoglobin 05/19/2020 13.9  12.0 - 15.9 g/dL Final   • Hematocrit 05/19/2020 39.8  34.0 - 46.6 % Final   • MCV 05/19/2020 86.7  79.0 - 97.0 fL Final   • MCH 05/19/2020 30.3  26.6 - 33.0 pg Final   • MCHC 05/19/2020 34.9  31.5 - 35.7 g/dL Final   • RDW 05/19/2020 12.8  12.3 - 15.4 % Final   • RDW-SD 05/19/2020 40.3  37.0 - 54.0 fl Final   • MPV 05/19/2020 10.1  6.0 - 12.0 fL Final   • Platelets 05/19/2020 251  140 - 450 10*3/mm3 Final   • Neutrophil % 05/19/2020 41.9* 42.7 - 76.0 % Final   • Lymphocyte % 05/19/2020 46.9* 19.6 - 45.3 % Final   • Monocyte % 05/19/2020 7.6  5.0 - 12.0 % Final   • Eosinophil % 05/19/2020 2.2  0.3 - 6.2 % Final   • Basophil % 05/19/2020 0.7  0.0 - 1.5 % Final   • Immature Grans % 05/19/2020 0.7* 0.0 - 0.5 % Final   • Neutrophils, Absolute 05/19/2020 2.48  1.70 - 7.00 10*3/mm3 Final   • Lymphocytes, Absolute 05/19/2020 2.77  0.70 - 3.10 10*3/mm3 Final   • Monocytes, Absolute 05/19/2020 0.45  0.10 - 0.90 10*3/mm3 Final   • Eosinophils, Absolute 05/19/2020 0.13  0.00 - 0.40 10*3/mm3 Final   • Basophils, Absolute 05/19/2020 0.04  0.00 - 0.20 10*3/mm3 Final   • Immature Grans, Absolute 05/19/2020 0.04  0.00 - 0.05 10*3/mm3 Final   • nRBC 05/19/2020 0.0  0.0 - 0.2 /100 WBC Final       RADIOLOGY:  No results found.         Assessment/Plan  Kathy Meadows is a 47 y.o. year old female referred due to abnormal PFA studies and workup revealed severe iron deficiency anemia. I spent a lot of time explaining to the pt the etiologies of ROSALIND. GI blood loss versus not being able to absorb. This is on going and she had a c-scope in 2017 which showed  polyp and a polypectomy done, recall in a year. EGD showed barrets. She is also supposed to be on B12 shots by PCP. Advised that she might not be absorbing iron and this sometimes goes hand in hand with ROSALIND. She also had concerns about ferritin of 792 on 4/13/20 after injectafer infusion which I told her happens but we need to keep an eye on it, to make sure that it does not keep elevating which could prompt testing for HFE gene. Labs for ferritin and iron profile pending for today. Will act accordingly.    1. Anemia 2/2 iron def: on ferrous sulfate   -wbc 5.91, Hg 13.9, plt 251  -check iron profile, ferritin     2. Fatigue: pt reports sleeping very well but still wakes up exhausted. Labs show  TSH, fT4, fT3 normal at 1.85, 1.16 and 3.66 respectively  -refer for sleep study. I don't see that it was done.          Milvia Bhatti MD    5/19/2020    15:14

## 2020-06-25 ENCOUNTER — OFFICE VISIT (OUTPATIENT)
Dept: NEUROLOGY | Facility: CLINIC | Age: 50
End: 2020-06-25

## 2020-06-25 VITALS
RESPIRATION RATE: 18 BRPM | DIASTOLIC BLOOD PRESSURE: 80 MMHG | HEART RATE: 72 BPM | SYSTOLIC BLOOD PRESSURE: 120 MMHG | HEIGHT: 65 IN | WEIGHT: 246 LBS | BODY MASS INDEX: 40.98 KG/M2

## 2020-06-25 DIAGNOSIS — G47.9 SLEEP DISTURBANCE: ICD-10-CM

## 2020-06-25 DIAGNOSIS — G47.19 EXCESSIVE DAYTIME SLEEPINESS: Primary | ICD-10-CM

## 2020-06-25 PROCEDURE — 99203 OFFICE O/P NEW LOW 30 MIN: CPT | Performed by: PSYCHIATRY & NEUROLOGY

## 2020-06-25 RX ORDER — MELATONIN
1000 DAILY
COMMUNITY
End: 2021-12-08 | Stop reason: ALTCHOICE

## 2020-06-25 NOTE — PROGRESS NOTES
HISTORY: Patient seen for probable sleep apnea.  Patient has been observed to have apnea per  and excessive snoring with excessive daytime sleepiness and fatigue.  Patient awakens with dry mouth and coughing, choking or respiratory discomfort with sore throat when she wakes up in the morning.  Patient has morning headaches.  Patient has trouble breathing through her nose at night.  Patient has reflux.  Patient has problems initiating sleep at night and restless sleep with excessive sweating during sleep and grinds her teeth.  Patient has had previous gallbladder surgery and hysterectomy.  Patient has hypertension and iron deficiency anemia.  Patient goes to bed at 10 PM to 10:30 PM and awakens at 6 AM to 6:30 AM.  Patient works in [employer] for [employer] and does a lot of driving and has not felt sleepy when driving.  Patient has an Orange Sleepiness Scale of 13 and stop bang of 5.  Patient's medications are noted.  Patient takes trazodone for sleep.  Patient takes about 30 minutes to fall asleep.  Patient has allergy to penicillin.  Patient does not smoke or use alcohol or drugs    EXAM: Blood pressure 120/80 left arm seated in same standing with pulse 72.  BMI is 40.9.  Neck circumference is less than 16 inches (15.5 inches).  Patient has no bruits/no lymphadenopathy.  Patient has no acute fundic abnormalities.  No acute oropharynx abnormalities. Mallampati 3.  No acute cardiopulmonary abnormalities by auscultation.  Abdomen nondistended and no liver edge or spleen edge felt.  Tympanic membranes normal.    IMPRESSION: Patient with probable DYLLAN with sleep disturbance.  Driving and safety precautions reviewed.  Patient to get overnight continuous oximetry and either home sleep study or in lab polysomnogram.  Patient's blood work reviewed.  I spent 30 minutes with this patient with 20 minutes counseling.Patient's Body mass index is 40.94 kg/m². BMI is above normal parameters. Recommendations  include: referral to primary care.

## 2020-06-25 NOTE — PATIENT INSTRUCTIONS
Driving precautions.  Patient also have precautions climbing using sharp cutting tools and working around the hot fire/stove/water/grill.  Patient to get with PCP about weight and diet control

## 2020-07-01 DIAGNOSIS — G47.19 EXCESSIVE DAYTIME SLEEPINESS: Primary | ICD-10-CM

## 2020-07-01 DIAGNOSIS — G47.9 SLEEP DISTURBANCES: ICD-10-CM

## 2020-07-02 DIAGNOSIS — G47.19 EXCESSIVE DAYTIME SLEEPINESS: ICD-10-CM

## 2020-07-02 DIAGNOSIS — G47.9 SLEEP DISTURBANCE: ICD-10-CM

## 2020-07-15 ENCOUNTER — HOSPITAL ENCOUNTER (OUTPATIENT)
Dept: SLEEP MEDICINE | Facility: HOSPITAL | Age: 50
Discharge: HOME | End: 2020-07-15
Admitting: PSYCHIATRY & NEUROLOGY

## 2020-07-15 DIAGNOSIS — G47.9 SLEEP DISTURBANCES: ICD-10-CM

## 2020-07-15 DIAGNOSIS — G47.19 EXCESSIVE DAYTIME SLEEPINESS: ICD-10-CM

## 2020-07-15 PROCEDURE — 95800 SLP STDY UNATTENDED: CPT | Performed by: PSYCHIATRY & NEUROLOGY

## 2020-07-15 PROCEDURE — 95800 SLP STDY UNATTENDED: CPT

## 2020-08-18 DIAGNOSIS — D50.9 IRON DEFICIENCY ANEMIA, UNSPECIFIED IRON DEFICIENCY ANEMIA TYPE: Primary | ICD-10-CM

## 2020-08-19 ENCOUNTER — LAB (OUTPATIENT)
Dept: LAB | Facility: HOSPITAL | Age: 50
End: 2020-08-19

## 2020-08-19 ENCOUNTER — OFFICE VISIT (OUTPATIENT)
Dept: ONCOLOGY | Facility: CLINIC | Age: 50
End: 2020-08-19

## 2020-08-19 VITALS
OXYGEN SATURATION: 98 % | HEIGHT: 65 IN | DIASTOLIC BLOOD PRESSURE: 82 MMHG | SYSTOLIC BLOOD PRESSURE: 168 MMHG | BODY MASS INDEX: 41.99 KG/M2 | TEMPERATURE: 98.7 F | HEART RATE: 65 BPM | RESPIRATION RATE: 18 BRPM | WEIGHT: 252 LBS

## 2020-08-19 DIAGNOSIS — D50.9 IRON DEFICIENCY ANEMIA, UNSPECIFIED IRON DEFICIENCY ANEMIA TYPE: Primary | ICD-10-CM

## 2020-08-19 DIAGNOSIS — I10 BENIGN ESSENTIAL HYPERTENSION: ICD-10-CM

## 2020-08-19 LAB
ALBUMIN SERPL-MCNC: 4.1 G/DL (ref 3.5–5.2)
ALBUMIN/GLOB SERPL: 1.7 G/DL
ALP SERPL-CCNC: 103 U/L (ref 39–117)
ALT SERPL W P-5'-P-CCNC: 41 U/L (ref 1–33)
ANION GAP SERPL CALCULATED.3IONS-SCNC: 12 MMOL/L (ref 5–15)
AST SERPL-CCNC: 26 U/L (ref 1–32)
BASOPHILS # BLD AUTO: 0.03 10*3/MM3 (ref 0–0.2)
BASOPHILS NFR BLD AUTO: 0.5 % (ref 0–1.5)
BILIRUB SERPL-MCNC: 0.4 MG/DL (ref 0–1.2)
BUN SERPL-MCNC: 15 MG/DL (ref 6–20)
BUN/CREAT SERPL: 32.6 (ref 7–25)
CALCIUM SPEC-SCNC: 8.8 MG/DL (ref 8.6–10.5)
CHLORIDE SERPL-SCNC: 106 MMOL/L (ref 98–107)
CO2 SERPL-SCNC: 24 MMOL/L (ref 22–29)
CREAT SERPL-MCNC: 0.46 MG/DL (ref 0.57–1)
DEPRECATED RDW RBC AUTO: 38.5 FL (ref 37–54)
EOSINOPHIL # BLD AUTO: 0.2 10*3/MM3 (ref 0–0.4)
EOSINOPHIL NFR BLD AUTO: 3.3 % (ref 0.3–6.2)
ERYTHROCYTE [DISTWIDTH] IN BLOOD BY AUTOMATED COUNT: 12.2 % (ref 12.3–15.4)
FERRITIN SERPL-MCNC: 466.6 NG/ML (ref 13–150)
GFR SERPL CREATININE-BSD FRML MDRD: 144 ML/MIN/1.73
GLOBULIN UR ELPH-MCNC: 2.4 GM/DL
GLUCOSE SERPL-MCNC: 109 MG/DL (ref 65–99)
HCT VFR BLD AUTO: 40 % (ref 34–46.6)
HGB BLD-MCNC: 13.7 G/DL (ref 12–15.9)
HOLD SPECIMEN: NORMAL
IMM GRANULOCYTES # BLD AUTO: 0.03 10*3/MM3 (ref 0–0.05)
IMM GRANULOCYTES NFR BLD AUTO: 0.5 % (ref 0–0.5)
IRON 24H UR-MRATE: 67 MCG/DL (ref 37–145)
IRON SATN MFR SERPL: 18 % (ref 20–50)
LYMPHOCYTES # BLD AUTO: 2.49 10*3/MM3 (ref 0.7–3.1)
LYMPHOCYTES NFR BLD AUTO: 40.8 % (ref 19.6–45.3)
MCH RBC QN AUTO: 29.5 PG (ref 26.6–33)
MCHC RBC AUTO-ENTMCNC: 34.3 G/DL (ref 31.5–35.7)
MCV RBC AUTO: 86.2 FL (ref 79–97)
MONOCYTES # BLD AUTO: 0.59 10*3/MM3 (ref 0.1–0.9)
MONOCYTES NFR BLD AUTO: 9.7 % (ref 5–12)
NEUTROPHILS NFR BLD AUTO: 2.77 10*3/MM3 (ref 1.7–7)
NEUTROPHILS NFR BLD AUTO: 45.2 % (ref 42.7–76)
NRBC BLD AUTO-RTO: 0 /100 WBC (ref 0–0.2)
PLATELET # BLD AUTO: 250 10*3/MM3 (ref 140–450)
PMV BLD AUTO: 10.1 FL (ref 6–12)
POTASSIUM SERPL-SCNC: 3.7 MMOL/L (ref 3.5–5.2)
PROT SERPL-MCNC: 6.5 G/DL (ref 6–8.5)
RBC # BLD AUTO: 4.64 10*6/MM3 (ref 3.77–5.28)
SODIUM SERPL-SCNC: 142 MMOL/L (ref 136–145)
TIBC SERPL-MCNC: 364 MCG/DL (ref 298–536)
TRANSFERRIN SERPL-MCNC: 244 MG/DL (ref 200–360)
WBC # BLD AUTO: 6.11 10*3/MM3 (ref 3.4–10.8)

## 2020-08-19 PROCEDURE — 85025 COMPLETE CBC W/AUTO DIFF WBC: CPT | Performed by: NURSE PRACTITIONER

## 2020-08-19 PROCEDURE — 99213 OFFICE O/P EST LOW 20 MIN: CPT | Performed by: NURSE PRACTITIONER

## 2020-08-19 PROCEDURE — 80053 COMPREHEN METABOLIC PANEL: CPT | Performed by: NURSE PRACTITIONER

## 2020-08-19 PROCEDURE — 83540 ASSAY OF IRON: CPT | Performed by: NURSE PRACTITIONER

## 2020-08-19 PROCEDURE — 36415 COLL VENOUS BLD VENIPUNCTURE: CPT | Performed by: NURSE PRACTITIONER

## 2020-08-19 PROCEDURE — 84466 ASSAY OF TRANSFERRIN: CPT | Performed by: NURSE PRACTITIONER

## 2020-08-19 PROCEDURE — 82728 ASSAY OF FERRITIN: CPT | Performed by: NURSE PRACTITIONER

## 2020-08-19 NOTE — PROGRESS NOTES
[facility]  HEMATOLOGY & ONCOLOGY    W ONC [facility] HEMATOLOGY AND ONCOLOGY  [address]  [facility] [address] [phone]    Patient Name: Kathy Meadows  Encounter Date: 08/19/2020  YOB: 1970  Patient Number: [id number]    Chief Complaint   Patient presents with   • ROSALIND     WAS DIAGNOSED RECENTLY WITH SLEEP APNEA AND IS NOW ON A MACHINE. NO OTHER NEW PROBLEMS.        REASON FOR VISIT: Mrs. Meadows is a 50 yo female patient here today in follow up for iron deficiency.  She has been followed by Dr Bhatti.  She underwent Injectafer on 2/21/20 and 2/27/20 for a baseline iron saturation of 12% per Dr Bhatti.  The patient's hemoglobin was at 14.3.      Her iron saturation was up to 24% on 4/13/20 but today it is down to 18%.  Her Ferritin is at 466 and hgb is at 13.9.  She is overall feeling well today. She has no complaints.      Her egd and colonoscopy was in 2019 at [facility]. There was no bleeding noted.       PAST MEDICAL HISTORY:  ALLERGIES:  Allergies   Allergen Reactions   • Amoxicillin Hives       CURRENT MEDICATIONS:  Outpatient Encounter Medications as of 8/19/2020   Medication Sig Dispense Refill   • B Complex-C-E-Zn (B COMPLEX-C-E-ZINC) tablet Take 1 tablet by mouth Daily.     • bethanechol (URECHOLINE) 25 MG tablet Take 25 mg by mouth 3 (Three) Times a Day.     • cholecalciferol (VITAMIN D3) 25 MCG (1000 UT) tablet Take 1,000 Units by mouth Daily.     • Cyanocobalamin (VITAMIN B-12 PO) Take  by mouth. Takes 6mcg daily     • ferrous sulfate 325 (65 FE) MG tablet Take 1 tablet by mouth Daily With Breakfast. 30 tablet 2   • magnesium oxide (MAGOX) 400 (241.3 Mg) MG tablet tablet Take 400 mg by mouth Daily.     • metoprolol succinate XL (TOPROL-XL) 25 MG 24 hr tablet TK 1 T PO ONCE A DAY. FOR HIGH BP  2   • omeprazole (priLOSEC) 40 MG capsule TK ONE C PO BID  2   • traZODone (DESYREL) 100  MG tablet TK 1 T PO HS. TO IMPROVE QUALITY OF SLEEP  2   • vitamin D (ERGOCALCIFEROL) 31387 units capsule capsule TK ONE C PO ONCE A WEEK  2     No facility-administered encounter medications on file as of 8/19/2020.      ADULT ILLNESSES:  Patient Active Problem List   Diagnosis Code   • Abnormal platelet function test (CMS/[facility]) D69.1   • Hematuria R31.9   • Iron deficiency anemia D50.9   • Intestinal malabsorption K90.9   • Gastroesophageal reflux disease K21.9   • Benign essential hypertension I10   • Chest heaviness R07.89   • Microscopic hematuria R31.29   • Palpitations R00.2   • Positive cardiac stress test R94.39   • Simple renal cyst N28.1   • Urinary incontinence R32   • Urinary urgency R39.15     SURGERIES:  Past Surgical History:   Procedure Laterality Date   • BREAST AUGMENTATION     • CHOLECYSTECTOMY     • HYSTERECTOMY       HEALTH MAINTENANCE ITEMS:    Last Completed Colonoscopy       Status Date      COLONOSCOPY 2019, 2 polyps removed        FAMILY HISTORY:  Family History   Problem Relation Age of Onset   • Stroke Father      SOCIAL HISTORY:  Social History     Socioeconomic History   • Marital status:      Spouse name: Not on file   • Number of children: Not on file   • Years of education: Not on file   • Highest education level: Not on file   Tobacco Use   • Smoking status: Never Smoker   • Smokeless tobacco: Never Used   Substance and Sexual Activity   • Alcohol use: No   • Drug use: No   • Sexual activity: Defer       REVIEW OF SYSTEMS:  Review of Systems   Constitutional: Negative for activity change, appetite change, chills, diaphoresis, fatigue, fever and unexpected weight loss.   HENT: Negative for ear pain, nosebleeds, sinus pressure, sore throat and voice change.    Eyes: Negative for blurred vision, double vision, pain and visual disturbance.   Respiratory: Negative for cough and shortness of breath.    Cardiovascular: Negative for chest pain, palpitations and leg swelling.  "  Gastrointestinal: Negative for abdominal pain, anal bleeding, blood in stool, constipation, diarrhea, nausea and vomiting.   Endocrine: Negative for heat intolerance, polydipsia and polyuria.   Genitourinary: Negative for dysuria, frequency, hematuria, urgency and urinary incontinence.   Musculoskeletal: Negative for arthralgias and myalgias.   Skin: Negative for rash and skin lesions.   Neurological: Negative for dizziness, tremors, seizures, syncope, speech difficulty, weakness and headache.   Hematological: Negative for adenopathy. Does not bruise/bleed easily.   Psychiatric/Behavioral: Negative for dysphoric mood, sleep disturbance, suicidal ideas and depressed mood.       /82   Pulse 65   Temp 98.7 °F (37.1 °C)   Resp 18   Ht 165.1 cm (65\")   Wt 114 kg (252 lb)   LMP  (LMP Unknown)   SpO2 98%   Breastfeeding No   BMI 41.93 kg/m²  Body surface area is 2.18 meters squared.  Pain Score    08/19/20 1445   PainSc: 0-No pain       Physical Exam:  Physical Exam   Constitutional: She is oriented to person, place, and time. She appears well-developed and well-nourished.   HENT:   Head: Atraumatic.   Mouth/Throat: Oropharynx is clear and moist.   Eyes: Pupils are equal, round, and reactive to light. EOM are normal. No scleral icterus.   Neck: Trachea normal. Neck supple. No JVD present.   Cardiovascular: Normal rate, regular rhythm and normal pulses. Exam reveals no gallop and no friction rub.   No murmur heard.  Pulmonary/Chest: Effort normal and breath sounds normal. She has no wheezes. She has no rhonchi. She has no rales. Chest wall is not dull to percussion.   Abdominal: Soft. Normal appearance. There is no tenderness. There is no rebound and no guarding.   Lymphadenopathy:     She has no cervical adenopathy.     She has no axillary adenopathy.        Right: No inguinal and no supraclavicular adenopathy present.        Left: No inguinal and no supraclavicular adenopathy present.   Neurological: She " is alert and oriented to person, place, and time. She has normal strength. No sensory deficit.   Psychiatric: She has a normal mood and affect. Judgment normal.       Kathy Meadows reports a pain score of 0. Patient's Body mass index is 41.93 kg/m². BMI is above normal parameters. Recommendations include: DEFER TO PCP.      LABS    Lab Results - Last 18 Months   Lab Units 08/19/20  1434 05/19/20  1450 04/13/20  1006 02/17/20  1450   HEMOGLOBIN g/dL 13.7 13.9 14.8 14.3   HEMATOCRIT % 40.0 39.8 43.6 42.1   MCV fL 86.2 86.7 87.2 85.6   WBC 10*3/mm3 6.11 5.91 5.17 6.67   RDW % 12.2* 12.8 12.8 12.5   MPV fL 10.1 10.1 10.1 9.9   PLATELETS 10*3/mm3 250 251 264 324   IMM GRAN % % 0.5 0.7* 0.6* 0.4   NEUTROS ABS 10*3/mm3 2.77 2.48 2.15 3.02   LYMPHS ABS 10*3/mm3 2.49 2.77 2.32 2.89   MONOS ABS 10*3/mm3 0.59 0.45 0.49 0.51   EOS ABS 10*3/mm3 0.20 0.13 0.14 0.18   BASOS ABS 10*3/mm3 0.03 0.04 0.04 0.04   IMMATURE GRANS (ABS) 10*3/mm3 0.03 0.04 0.03 0.03   NRBC /100 WBC 0.0 0.0 0.0 0.0       Lab Results - Last 18 Months   Lab Units 08/19/20  1434 05/19/20  1450 04/13/20  1006 02/17/20  1450   GLUCOSE mg/dL 109* 137* 122* 125*   SODIUM mmol/L 142 144 142 142   POTASSIUM mmol/L 3.7 3.4* 3.6 3.4*   CO2 mmol/L 24.0 28.0 27.0 27.0   CHLORIDE mmol/L 106 105 103 103   ANION GAP mmol/L 12.0 11.0 12.0 12.0   CREATININE mg/dL 0.46* 0.43* 0.51* 0.46*   BUN mg/dL 15 12 12 11   BUN / CREAT RATIO  32.6* 27.9* 23.5 23.9   CALCIUM mg/dL 8.8 9.1 9.1 8.8   EGFR IF NONAFRICN AM mL/min/1.73 144 >150 128 144   ALK PHOS U/L 103 87 89 91   TOTAL PROTEIN g/dL 6.5 6.8 7.1 7.2   ALT (SGPT) U/L 41* 45* 43* 33   AST (SGOT) U/L 26 31 29 29   BILIRUBIN mg/dL 0.4 0.5 0.6 0.4   ALBUMIN g/dL 4.10 4.20 4.30 4.30   GLOBULIN gm/dL 2.4 2.6 2.8 2.9       Lab Results - Last 18 Months   Lab Units 08/19/20  1434 05/19/20  1450 04/13/20  1006 02/17/20  1450   IRON mcg/dL 67 63 80 48   TIBC mcg/dL 364 337 337 390   IRON SATURATION % 18* 19* 24 12*   FERRITIN ng/mL  466.60* 657.90* 792.30* 217.50*   TSH uIU/mL  --   --   --  1.850     ASSESSMENT  1.  Iron deficiency resolved at present with saturation of 18% and ferritin of 466.60. Her saturation is a little lower but her hgb is not being affected at this time therefore we will continue to monitor and supplement iron when needed.   2.  Fatigue has improved  3.  Sleep apnea, now on CPAP  4.  Vit B12 deficiency on oral supplementation  5.  GERD controlled with prilosec  6.  Controlled HTN on medications    PLAN  1. Counseled patient regarding normal cbc and cmp.   2. Counseled patient regarding slightly low iron saturation and elevated ferritin  3  Reviewed records and discussed tolerance of Injectafer  4.  Continue to follow with pcp and other specialists  5.  Return in 3 mo for follow up at [place] office with preoffice cbc, cmp and iron studies.     I spent 20 total minutes, face-to-face, caring for Kathy today.  Greater than 50% of this time involved counseling and/or coordination of care as documented within this note regarding the patient's illness(es), pros and cons of various treatment options, instructions and/or risk reduction.    Krystyna Holcomb, APRN  08/19/2020  10:58 PM

## 2020-08-20 ENCOUNTER — TELEPHONE (OUTPATIENT)
Dept: ONCOLOGY | Facility: CLINIC | Age: 50
End: 2020-08-20

## 2020-08-20 NOTE — TELEPHONE ENCOUNTER
notified pt she v/u    ----- Message from VITO Hampton sent at 8/20/2020  9:11 AM CDT -----  Please let Kathy know that the saturation is down 1 % to 18 and ferritin dropped to 466 from 657 but I would just watch for now since she is not anemic and still with plenty of stored iron.

## 2020-10-19 ENCOUNTER — OFFICE VISIT (OUTPATIENT)
Dept: NEUROLOGY | Facility: CLINIC | Age: 50
End: 2020-10-19

## 2020-10-19 VITALS
WEIGHT: 248 LBS | HEART RATE: 66 BPM | RESPIRATION RATE: 18 BRPM | DIASTOLIC BLOOD PRESSURE: 80 MMHG | HEIGHT: 65 IN | BODY MASS INDEX: 41.32 KG/M2 | SYSTOLIC BLOOD PRESSURE: 130 MMHG

## 2020-10-19 DIAGNOSIS — G47.33 OBSTRUCTIVE SLEEP APNEA: Primary | ICD-10-CM

## 2020-10-19 PROCEDURE — 99213 OFFICE O/P EST LOW 20 MIN: CPT | Performed by: PSYCHIATRY & NEUROLOGY

## 2020-10-19 NOTE — PROGRESS NOTES
HISTORY: Patient with history of DYLLAN now on AutoPap.  Overnight continuous oximetry on AutoPap showed no significant desaturations at or below 88%.  MARNIE was slightly increased at 18 but compliance report shows AHI is 1.5.  Patient has occasional mask leak but not significant.  Patient is in compliance using only 6-1/2 hours average per day.  She says she feels better and less sleepy during the day.  Stop bang is 2 and Hinckley Sleepiness Scale is 7.  Patient has not had any trouble driving.  She uses masks under the nose and covering her mouth for DME [employer].    EXAM: BMI of 41.27.  Neck circumference of 16 inches.  No acute fundic abnormalities.  No tympanic membrane abnormalities.  No acute oropharynx abnormalities.Mallampati 3.  No liver edge or spleen edge felt /nondistended abdomen.  No acute cardiopulmonary abnormalities by auscultation.  No bruits/no lymphadenopathy    IMPRESSION: Patient with DYLLAN stable at this point and is to continue safety and driving precautions as discussed.  I spent 15 minutes with patient with 10 minutes counseling.Patient's Body mass index is 41.27 kg/m². BMI is above normal parameters. Recommendations include: referral to primary care.

## 2020-10-19 NOTE — PATIENT INSTRUCTIONS
Continue driving and safety precautions as discussed.  Patient to continue follow-up with PCP about weight and diet control

## 2020-12-08 ENCOUNTER — TELEPHONE (OUTPATIENT)
Dept: ONCOLOGY | Facility: CLINIC | Age: 50
End: 2020-12-08

## 2020-12-08 DIAGNOSIS — D50.9 IRON DEFICIENCY ANEMIA, UNSPECIFIED IRON DEFICIENCY ANEMIA TYPE: Primary | ICD-10-CM

## 2020-12-08 DIAGNOSIS — D50.9 NORMOCYTIC HYPOCHROMIC ANEMIA: ICD-10-CM

## 2020-12-08 NOTE — TELEPHONE ENCOUNTER
PT:SELF    PT CALLING TO R/S HER APPT FOR THURS. ANY DAY AND TIME WORKS FOR HER. PLEASE ADVISE    PT #: [phone]

## 2021-01-29 ENCOUNTER — OUTSIDE FACILITY SERVICE (OUTPATIENT)
Dept: CARDIOLOGY | Facility: CLINIC | Age: 51
End: 2021-01-29

## 2021-01-29 PROCEDURE — 93010 ELECTROCARDIOGRAM REPORT: CPT | Performed by: INTERNAL MEDICINE

## 2021-04-19 ENCOUNTER — OFFICE VISIT (OUTPATIENT)
Dept: NEUROLOGY | Facility: CLINIC | Age: 51
End: 2021-04-19

## 2021-04-19 VITALS
HEIGHT: 65 IN | DIASTOLIC BLOOD PRESSURE: 80 MMHG | WEIGHT: 205 LBS | RESPIRATION RATE: 18 BRPM | BODY MASS INDEX: 34.16 KG/M2 | SYSTOLIC BLOOD PRESSURE: 130 MMHG | HEART RATE: 68 BPM

## 2021-04-19 DIAGNOSIS — G47.33 OSA (OBSTRUCTIVE SLEEP APNEA): Primary | ICD-10-CM

## 2021-04-19 PROCEDURE — 99213 OFFICE O/P EST LOW 20 MIN: CPT | Performed by: PSYCHIATRY & NEUROLOGY

## 2021-04-19 RX ORDER — MULTIPLE VITAMINS W/ MINERALS TAB 9MG-400MCG
1 TAB ORAL DAILY
COMMUNITY
End: 2021-12-08 | Stop reason: ALTCHOICE

## 2021-04-19 RX ORDER — MULTIPLE VITAMINS W/ MINERALS TAB 9MG-400MCG
1 TAB ORAL DAILY
COMMUNITY
End: 2021-06-15

## 2021-04-19 NOTE — PATIENT INSTRUCTIONS
Patient to get with PCP about weight and diet control.  Patient to continue safety precautions and driving precautions as previously discussed

## 2021-04-19 NOTE — PROGRESS NOTES
HISTORY: Patient with a history of DYLLAN using AutoPap device 8-16.  Patient February this year and gastric bypass is lost 50 pounds and is planning on losing another 50 pounds.  She wonders whether she can stop the Pap device.  Patient has mass that goes under the nose and around her mouth and is comfortable with that.  She has not had any problems with it since her gastric bypass.  She uses [employer] as her DME.  Patient Irving Sleepiness Scale is 4.  Stop bang is 3.  She has not had any other medical problems since last seen.    EXAM: Patient blood pressure 130/80 left arm sitting and standing with pulse 68.  Patient has no bruits/no lymphadenopathy.  No acute oropharynx abnormalities.  Mallampati 3.  No liver edge or spleen edge felt and abdomen nondistended.  No acute cardiopulmonary abnormalities by auscultation.  Tympanic membranes normal.  No acute fundic abnormalities.  Neck circumference is 15 inches.  BMI is 34.1.    IMPRESSION: Patient is to get a split-night study to see if she needs the Pap device again.  She did not want this study to be done before 1 August.Patient's Body mass index is 34.11 kg/m². BMI is above normal parameters. Recommendations include: referral to a nutritionist.  I spent 20 minutes with this patient with review of records and exam and counseling.

## 2021-06-11 DIAGNOSIS — D50.9 IRON DEFICIENCY ANEMIA, UNSPECIFIED IRON DEFICIENCY ANEMIA TYPE: Primary | ICD-10-CM

## 2021-06-15 ENCOUNTER — LAB (OUTPATIENT)
Dept: ONCOLOGY | Facility: CLINIC | Age: 51
End: 2021-06-15

## 2021-06-15 ENCOUNTER — OFFICE VISIT (OUTPATIENT)
Dept: ONCOLOGY | Facility: CLINIC | Age: 51
End: 2021-06-15

## 2021-06-15 VITALS
HEART RATE: 52 BPM | DIASTOLIC BLOOD PRESSURE: 72 MMHG | OXYGEN SATURATION: 98 % | TEMPERATURE: 98.2 F | SYSTOLIC BLOOD PRESSURE: 110 MMHG | RESPIRATION RATE: 16 BRPM | BODY MASS INDEX: 31.32 KG/M2 | HEIGHT: 65 IN | WEIGHT: 188 LBS

## 2021-06-15 DIAGNOSIS — D50.9 IRON DEFICIENCY ANEMIA, UNSPECIFIED IRON DEFICIENCY ANEMIA TYPE: Primary | ICD-10-CM

## 2021-06-15 DIAGNOSIS — D50.9 NORMOCYTIC HYPOCHROMIC ANEMIA: ICD-10-CM

## 2021-06-15 DIAGNOSIS — D50.9 IRON DEFICIENCY ANEMIA, UNSPECIFIED IRON DEFICIENCY ANEMIA TYPE: ICD-10-CM

## 2021-06-15 LAB
ALBUMIN SERPL-MCNC: 4.1 G/DL (ref 3.5–5.2)
ALBUMIN/GLOB SERPL: 1.4 G/DL
ALP SERPL-CCNC: 114 U/L (ref 39–117)
ALT SERPL W P-5'-P-CCNC: 262 U/L (ref 1–33)
ANION GAP SERPL CALCULATED.3IONS-SCNC: 10 MMOL/L (ref 5–15)
AST SERPL-CCNC: 123 U/L (ref 1–32)
BASOPHILS # BLD AUTO: 0.03 10*3/MM3 (ref 0–0.2)
BASOPHILS NFR BLD AUTO: 0.5 % (ref 0–1.5)
BILIRUB SERPL-MCNC: 0.8 MG/DL (ref 0–1.2)
BUN SERPL-MCNC: 12 MG/DL (ref 6–20)
BUN/CREAT SERPL: 27.3 (ref 7–25)
CALCIUM SPEC-SCNC: 9.4 MG/DL (ref 8.6–10.5)
CHLORIDE SERPL-SCNC: 106 MMOL/L (ref 98–107)
CO2 SERPL-SCNC: 29 MMOL/L (ref 22–29)
CREAT SERPL-MCNC: 0.44 MG/DL (ref 0.57–1)
DEPRECATED RDW RBC AUTO: 46 FL (ref 37–54)
EOSINOPHIL # BLD AUTO: 0.13 10*3/MM3 (ref 0–0.4)
EOSINOPHIL NFR BLD AUTO: 2.1 % (ref 0.3–6.2)
ERYTHROCYTE [DISTWIDTH] IN BLOOD BY AUTOMATED COUNT: 13.6 % (ref 12.3–15.4)
FERRITIN SERPL-MCNC: 913.3 NG/ML (ref 13–150)
GFR SERPL CREATININE-BSD FRML MDRD: >150 ML/MIN/1.73
GLOBULIN UR ELPH-MCNC: 2.9 GM/DL
GLUCOSE SERPL-MCNC: 92 MG/DL (ref 65–99)
HCT VFR BLD AUTO: 42.9 % (ref 34–46.6)
HGB BLD-MCNC: 14 G/DL (ref 12–15.9)
IMM GRANULOCYTES # BLD AUTO: 0.01 10*3/MM3 (ref 0–0.05)
IMM GRANULOCYTES NFR BLD AUTO: 0.2 % (ref 0–0.5)
IRON 24H UR-MRATE: 95 MCG/DL (ref 37–145)
IRON SATN MFR SERPL: 29 % (ref 20–50)
LYMPHOCYTES # BLD AUTO: 3.08 10*3/MM3 (ref 0.7–3.1)
LYMPHOCYTES NFR BLD AUTO: 48.7 % (ref 19.6–45.3)
MCH RBC QN AUTO: 29.5 PG (ref 26.6–33)
MCHC RBC AUTO-ENTMCNC: 32.6 G/DL (ref 31.5–35.7)
MCV RBC AUTO: 90.3 FL (ref 79–97)
MONOCYTES # BLD AUTO: 0.44 10*3/MM3 (ref 0.1–0.9)
MONOCYTES NFR BLD AUTO: 7 % (ref 5–12)
NEUTROPHILS NFR BLD AUTO: 2.63 10*3/MM3 (ref 1.7–7)
NEUTROPHILS NFR BLD AUTO: 41.5 % (ref 42.7–76)
PLATELET # BLD AUTO: 251 10*3/MM3 (ref 140–450)
PMV BLD AUTO: 10.6 FL (ref 6–12)
POTASSIUM SERPL-SCNC: 3.9 MMOL/L (ref 3.5–5.2)
PROT SERPL-MCNC: 7 G/DL (ref 6–8.5)
RBC # BLD AUTO: 4.75 10*6/MM3 (ref 3.77–5.28)
SODIUM SERPL-SCNC: 145 MMOL/L (ref 136–145)
TIBC SERPL-MCNC: 328 MCG/DL (ref 298–536)
TRANSFERRIN SERPL-MCNC: 220 MG/DL (ref 200–360)
WBC # BLD AUTO: 6.32 10*3/MM3 (ref 3.4–10.8)

## 2021-06-15 PROCEDURE — 82607 VITAMIN B-12: CPT | Performed by: NURSE PRACTITIONER

## 2021-06-15 PROCEDURE — 82746 ASSAY OF FOLIC ACID SERUM: CPT | Performed by: NURSE PRACTITIONER

## 2021-06-15 PROCEDURE — 84466 ASSAY OF TRANSFERRIN: CPT | Performed by: NURSE PRACTITIONER

## 2021-06-15 PROCEDURE — 83540 ASSAY OF IRON: CPT | Performed by: NURSE PRACTITIONER

## 2021-06-15 PROCEDURE — 99213 OFFICE O/P EST LOW 20 MIN: CPT | Performed by: NURSE PRACTITIONER

## 2021-06-15 PROCEDURE — 80053 COMPREHEN METABOLIC PANEL: CPT | Performed by: NURSE PRACTITIONER

## 2021-06-15 PROCEDURE — 85025 COMPLETE CBC W/AUTO DIFF WBC: CPT | Performed by: NURSE PRACTITIONER

## 2021-06-15 PROCEDURE — 82728 ASSAY OF FERRITIN: CPT | Performed by: NURSE PRACTITIONER

## 2021-06-15 RX ORDER — MULTIPLE VITAMINS W/ MINERALS TAB 9MG-400MCG
1 TAB ORAL 2 TIMES DAILY
COMMUNITY

## 2021-06-15 RX ORDER — ASCORBIC ACID 500 MG
500 TABLET ORAL DAILY
COMMUNITY
End: 2021-12-08 | Stop reason: ALTCHOICE

## 2021-06-15 NOTE — PROGRESS NOTES
[facility]  HEMATOLOGY & ONCOLOGY    [facility] ONC [facility] HEMATOLOGY AND ONCOLOGY  [address]  [place] [address] [phone]    Patient Name: Kathy Meadows  Encounter Date: 06/15/2021  YOB: 1970  Patient Number: [id number]    Chief Complaint   Patient presents with   • ROSALIND     Here for f/u       REASON FOR VISIT: Mrs. Meadows is a 49yo female patient here today in follow up for iron deficiency.  She has been followed by Dr Bhatti. She underwent Injectafer on 2/21/20 and 2/27/20 for a baseline iron saturation of 12% per Dr Bhatti.  The patient's hemoglobin was at 14.3.      He presents back today in follow-up doing very well.  She had gastric bypass surgery in February by Dr. Yuen in [place].  She is done very well postoperatively.  She is now on multiple multivitamins.  She has no complaints today.    Her egd and colonoscopy was in 2019 at [facility]. There was no bleeding noted.  These were performed by Dr. Cook at [place].    Lab work today shows a white count of 6.32, hemoglobin 14, hematocrit 42.9 and a platelet count of 251,000.      PAST MEDICAL HISTORY:  ALLERGIES:  Allergies   Allergen Reactions   • Amoxicillin Hives       CURRENT MEDICATIONS:  Outpatient Encounter Medications as of 6/15/2021   Medication Sig Dispense Refill   • ascorbic acid (VITAMIN C) 500 MG tablet Take 500 mg by mouth Daily. 2000mg daily     • calcium carbonate-cholecalciferol 500-400 MG-UNIT tablet tablet Take  by mouth 3 (Three) Times a Day.     • cholecalciferol (VITAMIN D3) 25 MCG (1000 UT) tablet Take 1,000 Units by mouth Daily.     • metoprolol succinate XL (TOPROL-XL) 25 MG 24 hr tablet TK 1 T PO ONCE A DAY. FOR HIGH BP  2   • multivitamin with minerals (Hair Skin and Nails Formula) tablet tablet Take 1 tablet by mouth Daily.     • multivitamin with minerals (OPURITY PO) Take 1 tablet by mouth 2 (two) times a  day.     • omeprazole (priLOSEC) 40 MG capsule TK ONE C PO BID  2   • traZODone (DESYREL) 100 MG tablet TK 1 T PO HS. TO IMPROVE QUALITY OF SLEEP  2   • [DISCONTINUED] multivitamin with minerals tablet tablet Take 1 tablet by mouth Daily.     • [DISCONTINUED] Thiamine HCl (THIAMINE PO) Take  by mouth.       No facility-administered encounter medications on file as of 6/15/2021.     ADULT ILLNESSES:  Patient Active Problem List   Diagnosis Code   • Abnormal platelet function test (CMS/[facility]) D69.1   • Hematuria R31.9   • Iron deficiency anemia D50.9   • Intestinal malabsorption K90.9   • Gastroesophageal reflux disease K21.9   • Benign essential hypertension I10   • Chest heaviness R07.89   • Microscopic hematuria R31.29   • Palpitations R00.2   • Positive cardiac stress test R94.39   • Simple renal cyst N28.1   • Urinary incontinence R32   • Urinary urgency R39.15     SURGERIES:  Past Surgical History:   Procedure Laterality Date   • BREAST AUGMENTATION     • CHOLECYSTECTOMY     • GASTRIC BYPASS     • HYSTERECTOMY       HEALTH MAINTENANCE ITEMS:    Last Completed Colonoscopy       Status Date      COLONOSCOPY 2019, 2 polyps removed        FAMILY HISTORY:  Family History   Problem Relation Age of Onset   • Stroke Father      SOCIAL HISTORY:  Social History     Socioeconomic History   • Marital status:      Spouse name: Not on file   • Number of children: Not on file   • Years of education: Not on file   • Highest education level: Not on file   Tobacco Use   • Smoking status: Never Smoker   • Smokeless tobacco: Never Used   Substance and Sexual Activity   • Alcohol use: No   • Drug use: No   • Sexual activity: Defer       REVIEW OF SYSTEMS:  Review of Systems   Constitutional: Negative for activity change, appetite change, chills, diaphoresis, fatigue, fever and unexpected weight loss.   HENT: Negative for nosebleeds, sinus pressure, sore throat and voice change.    Eyes: Negative for blurred vision, double  "vision and visual disturbance.   Respiratory: Negative for cough and shortness of breath.    Cardiovascular: Negative for chest pain, palpitations and leg swelling.   Gastrointestinal: Negative for abdominal pain, anal bleeding, blood in stool, constipation, diarrhea, nausea and vomiting.   Endocrine: Negative for heat intolerance, polydipsia and polyuria.   Genitourinary: Negative for dysuria, frequency, hematuria, urgency and urinary incontinence.   Musculoskeletal: Negative for arthralgias and myalgias.   Skin: Negative for rash and skin lesions.   Neurological: Negative for dizziness and headache.   Hematological: Negative for adenopathy. Does not bruise/bleed easily.   Psychiatric/Behavioral: Negative for dysphoric mood, sleep disturbance, suicidal ideas and depressed mood.       /72   Pulse 52   Temp 98.2 °F (36.8 °C) (Temporal)   Resp 16   Ht 165.1 cm (65\")   Wt 85.3 kg (188 lb)   LMP  (LMP Unknown)   SpO2 98%   Breastfeeding No   BMI 31.28 kg/m²  Body surface area is 1.93 meters squared.  Pain Score    06/15/21 1307   PainSc: 0-No pain       Physical Exam:  Physical Exam   Constitutional: She is oriented to person, place, and time. She appears well-developed and well-nourished.   HENT:   Head: Atraumatic.   Mouth/Throat: Oropharynx is clear and moist.   Eyes: Pupils are equal, round, and reactive to light. No scleral icterus.   Neck: Trachea normal.   Cardiovascular: Normal rate, regular rhythm and normal heart sounds.   No murmur heard.  Pulmonary/Chest: Effort normal and breath sounds normal. She has no wheezes. She has no rhonchi. She has no rales. Chest wall is not dull to percussion.   Abdominal: Soft. Normal appearance. There is no abdominal tenderness. There is no rebound.   Lymphadenopathy:     She has no cervical adenopathy.   Neurological: She is alert and oriented to person, place, and time.   Skin: Skin is warm and dry.   Psychiatric: Her behavior is normal. Mood normal. "       Kathy Collieryelitza reports a pain score of 0. Patient's Body mass index is 31.28 kg/m². BMI is above normal parameters. Recommendations include: DEFER TO PCP.      LABS    Lab Results - Last 18 Months   Lab Units 06/15/21  1246 08/19/20  1434 05/19/20  1450 04/13/20  1006 02/17/20  1450   HEMOGLOBIN g/dL 14.0 13.7 13.9 14.8 14.3   HEMATOCRIT % 42.9 40.0 39.8 43.6 42.1   MCV fL 90.3 86.2 86.7 87.2 85.6   WBC 10*3/mm3 6.32 6.11 5.91 5.17 6.67   RDW % 13.6 12.2* 12.8 12.8 12.5   MPV fL 10.6 10.1 10.1 10.1 9.9   PLATELETS 10*3/mm3 251 250 251 264 324   IMM GRAN % % 0.2 0.5 0.7* 0.6* 0.4   NEUTROS ABS 10*3/mm3 2.63 2.77 2.48 2.15 3.02   LYMPHS ABS 10*3/mm3 3.08 2.49 2.77 2.32 2.89   MONOS ABS 10*3/mm3 0.44 0.59 0.45 0.49 0.51   EOS ABS 10*3/mm3 0.13 0.20 0.13 0.14 0.18   BASOS ABS 10*3/mm3 0.03 0.03 0.04 0.04 0.04   IMMATURE GRANS (ABS) 10*3/mm3 0.01 0.03 0.04 0.03 0.03   NRBC /100 WBC  --  0.0 0.0 0.0 0.0       Lab Results - Last 18 Months   Lab Units 08/19/20  1434 05/19/20  1450 04/13/20  1006 02/17/20  1450   GLUCOSE mg/dL 109* 137* 122* 125*   SODIUM mmol/L 142 144 142 142   POTASSIUM mmol/L 3.7 3.4* 3.6 3.4*   CO2 mmol/L 24.0 28.0 27.0 27.0   CHLORIDE mmol/L 106 105 103 103   ANION GAP mmol/L 12.0 11.0 12.0 12.0   CREATININE mg/dL 0.46* 0.43* 0.51* 0.46*   BUN mg/dL 15 12 12 11   BUN / CREAT RATIO  32.6* 27.9* 23.5 23.9   CALCIUM mg/dL 8.8 9.1 9.1 8.8   EGFR IF NONAFRICN AM mL/min/1.73 144 >150 128 144   ALK PHOS U/L 103 87 89 91   TOTAL PROTEIN g/dL 6.5 6.8 7.1 7.2   ALT (SGPT) U/L 41* 45* 43* 33   AST (SGOT) U/L 26 31 29 29   BILIRUBIN mg/dL 0.4 0.5 0.6 0.4   ALBUMIN g/dL 4.10 4.20 4.30 4.30   GLOBULIN gm/dL 2.4 2.6 2.8 2.9       Lab Results - Last 18 Months   Lab Units 08/19/20  1434 05/19/20  1450 04/13/20  1006 02/17/20  1450   IRON mcg/dL 67 63 80 48   TIBC mcg/dL 364 337 337 390   IRON SATURATION % 18* 19* 24 12*   FERRITIN ng/mL 466.60* 657.90* 792.30* 217.50*   TSH uIU/mL  --   --   --  1.850      ASSESSMENT  1.  Anemia secondary to iron deficiency currently resolved with a hemoglobin of 14 hematocrit of 42.9.  Patient has had gastric bypass surgery in February.  She is on multiple multivitamins to help supplement.  Recheck her iron and B12 and folate to get a new baseline at this time she is not anemic and we will need to continue to monitor.  2.  History of iron deficiency now on iron supplement and multivitamins postoperatively  3.  Sleep apnea, now on CPAP  4.  History of Vit B12 deficiency on oral supplementation  5.  GERD controlled with prilosec  6.  Controlled HTN on medications, blood pressure stable today at 110/72    PLAN  1.  Reviewed lab work available with the patient.    2.  Reviewed medications with the patient regarding her multivitamins   3.  Advised patient to continue taking them as she has been.  Will evaluate her iron B12 and folate and monitor these.    4.  Continue to follow with pcp and other specialists  5.  Return in 6 mo for follow up at [place] office with preoffice cbc, cmp and iron studies, B12 and folate.     I spent 20 minutes caring for Kathy on this date of service. This time includes time spent by me in the following activities: preparing for the visit, reviewing tests, performing a medically appropriate examination and/or evaluation, counseling and educating the patient/family/caregiver, ordering medications, tests, or procedures and documenting information in the medical record.       Krystyna Holcomb, APRN  06/15/2021

## 2021-06-16 ENCOUNTER — TELEPHONE (OUTPATIENT)
Dept: ONCOLOGY | Facility: CLINIC | Age: 51
End: 2021-06-16

## 2021-06-16 LAB
FOLATE SERPL-MCNC: >20 NG/ML (ref 4.78–24.2)
VIT B12 BLD-MCNC: 1335 PG/ML (ref 211–946)

## 2021-06-16 NOTE — TELEPHONE ENCOUNTER
Left v/m notifying pt of labs results.    ----- Message from VITO Hampton sent at 6/16/2021  8:39 AM CDT -----  Please let Kathy know that her ferritin is down to 913.  Again I feel this is likely an acute phase reactant to her recent surgery.  Please let her know also that her iron B12 and folate are all within normal range and to continue her oral multivitamin for now.

## 2021-08-05 ENCOUNTER — TRANSCRIBE ORDERS (OUTPATIENT)
Dept: LAB | Facility: HOSPITAL | Age: 51
End: 2021-08-05

## 2021-08-05 DIAGNOSIS — Z01.818 PREOPERATIVE TESTING: Primary | ICD-10-CM

## 2021-08-09 ENCOUNTER — LAB (OUTPATIENT)
Dept: LAB | Facility: HOSPITAL | Age: 51
End: 2021-08-09

## 2021-08-09 DIAGNOSIS — Z01.818 PREOPERATIVE TESTING: ICD-10-CM

## 2021-08-09 LAB — SARS-COV-2 ORF1AB RESP QL NAA+PROBE: NOT DETECTED

## 2021-08-09 PROCEDURE — C9803 HOPD COVID-19 SPEC COLLECT: HCPCS

## 2021-08-09 PROCEDURE — U0004 COV-19 TEST NON-CDC HGH THRU: HCPCS

## 2021-08-11 ENCOUNTER — HOSPITAL ENCOUNTER (OUTPATIENT)
Dept: SLEEP MEDICINE | Facility: HOSPITAL | Age: 51
Discharge: HOME | End: 2021-08-11
Admitting: PSYCHIATRY & NEUROLOGY

## 2021-08-11 VITALS
WEIGHT: 175 LBS | OXYGEN SATURATION: 99 % | SYSTOLIC BLOOD PRESSURE: 135 MMHG | DIASTOLIC BLOOD PRESSURE: 90 MMHG | RESPIRATION RATE: 14 BRPM | BODY MASS INDEX: 29.16 KG/M2 | HEIGHT: 65 IN | HEART RATE: 69 BPM

## 2021-08-11 DIAGNOSIS — G47.33 OSA (OBSTRUCTIVE SLEEP APNEA): ICD-10-CM

## 2021-08-11 PROCEDURE — 95810 POLYSOM 6/> YRS 4/> PARAM: CPT | Performed by: PSYCHIATRY & NEUROLOGY

## 2021-08-11 PROCEDURE — 95810 POLYSOM 6/> YRS 4/> PARAM: CPT

## 2021-11-11 ENCOUNTER — TELEPHONE (OUTPATIENT)
Dept: ONCOLOGY | Facility: CLINIC | Age: 51
End: 2021-11-11

## 2021-11-11 NOTE — TELEPHONE ENCOUNTER
ATTN HUB: PLEASE TRANSFER CALL TO OFFICE IF PT CALLS BACK  LEFT VM NEED TO R/S 12/14/21 [place] APPT TO DR GOLDBERG IN PAD.

## 2021-12-08 ENCOUNTER — OFFICE VISIT (OUTPATIENT)
Dept: NEUROLOGY | Facility: CLINIC | Age: 51
End: 2021-12-08

## 2021-12-08 VITALS
BODY MASS INDEX: 27.82 KG/M2 | RESPIRATION RATE: 18 BRPM | SYSTOLIC BLOOD PRESSURE: 122 MMHG | HEIGHT: 65 IN | DIASTOLIC BLOOD PRESSURE: 82 MMHG | HEART RATE: 68 BPM | WEIGHT: 167 LBS

## 2021-12-08 DIAGNOSIS — G47.61 PERIODIC LIMB MOVEMENT DISORDER (PLMD): Primary | ICD-10-CM

## 2021-12-08 PROCEDURE — 99213 OFFICE O/P EST LOW 20 MIN: CPT | Performed by: PSYCHIATRY & NEUROLOGY

## 2021-12-08 NOTE — PROGRESS NOTES
HISTORY: Patient had previous obstructive sleep apnea but had gastric bypass surgery and has lost some almost 90 pounds with BMI now 27.8. Patient had repeat polysomnogram which did not qualify her for continuing her CPAP. She did have previously a dream [employer] 1 AutoPap and I discussed with her and gave her the printed information regarding the recall of the device possibly causing particles and off gassing which may be injurious. Patient did not use an ionic . Patient also did not use humidification. She has not had any symptoms at this point. She stopped using the device after the polysomnogram 8/11/2021. Patient did have mild periodic leg movements and she has had iron deficiency anemia and was getting iron infusions at one point which may be contributory to her mild periodic leg movements. She has had recent iron studies which showed good saturation. She says that sometimes she will wake up with her legs hurting her. Patient has an [place] Sleepiness Scale of 5. She has had no trouble driving.    EXAM: Blood pressure 118/80 left arm seated 122/82 left arm standing with pulse 68. Neck circumference is 14.5. No acute fundic abnormalities noted. No tympanic membrane abnormalities. No acute oropharynx abnormalities. Mallampati 2. No acute cardiopulmonary abnormalities by auscultation. Abdomen nondistended with no liver edge or spleen edge felt.    IMPRESSION: Patient with previous history of DYLLAN with no longer evidence of sleep apnea. Patient is to review the information regarding the recall. I discussed with her were going ahead and registering the device with [employer]. She is to let them know she is no longer using the device. Mild periodic leg movements. I spent 20 minutes with this patient with counseling exam and review of records. Patient BMI approaching normal range.